# Patient Record
Sex: FEMALE | Race: WHITE | NOT HISPANIC OR LATINO | Employment: STUDENT | URBAN - METROPOLITAN AREA
[De-identification: names, ages, dates, MRNs, and addresses within clinical notes are randomized per-mention and may not be internally consistent; named-entity substitution may affect disease eponyms.]

---
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
## 2023-07-17 ENCOUNTER — TELEPHONE (OUTPATIENT)
Dept: INTERNAL MEDICINE CLINIC | Facility: OTHER | Age: 18
End: 2023-07-17

## 2023-07-17 ENCOUNTER — OFFICE VISIT (OUTPATIENT)
Dept: INTERNAL MEDICINE CLINIC | Age: 18
End: 2023-07-17
Payer: COMMERCIAL

## 2023-07-17 VITALS
BODY MASS INDEX: 28.95 KG/M2 | HEART RATE: 93 BPM | WEIGHT: 191 LBS | OXYGEN SATURATION: 98 % | HEIGHT: 68 IN | DIASTOLIC BLOOD PRESSURE: 72 MMHG | SYSTOLIC BLOOD PRESSURE: 102 MMHG | TEMPERATURE: 97.3 F

## 2023-07-17 DIAGNOSIS — Z11.59 NEED FOR HEPATITIS C SCREENING TEST: ICD-10-CM

## 2023-07-17 DIAGNOSIS — Z13.1 DIABETES MELLITUS SCREENING: ICD-10-CM

## 2023-07-17 DIAGNOSIS — L02.91 ABSCESS: Primary | ICD-10-CM

## 2023-07-17 DIAGNOSIS — Z13.220 LIPID SCREENING: ICD-10-CM

## 2023-07-17 DIAGNOSIS — Z11.4 ENCOUNTER FOR SCREENING FOR HIV: ICD-10-CM

## 2023-07-17 DIAGNOSIS — Z13.29 THYROID DISORDER SCREEN: ICD-10-CM

## 2023-07-17 PROCEDURE — 99202 OFFICE O/P NEW SF 15 MIN: CPT | Performed by: STUDENT IN AN ORGANIZED HEALTH CARE EDUCATION/TRAINING PROGRAM

## 2023-07-17 RX ORDER — AMOXICILLIN 500 MG/1
500 TABLET, FILM COATED ORAL 2 TIMES DAILY
COMMUNITY
Start: 2023-07-17 | End: 2023-07-21

## 2023-07-17 RX ORDER — DOXYCYCLINE HYCLATE 100 MG
100 TABLET ORAL 2 TIMES DAILY
Qty: 14 TABLET | Refills: 0 | Status: SHIPPED | OUTPATIENT
Start: 2023-07-17 | End: 2023-07-21

## 2023-07-17 NOTE — TELEPHONE ENCOUNTER
Hi, this is Milton Sessions I'm calling on my daughter Genet Stone. She was in earlier to see Doctor Heber Leiva and he wants her to get a CT scan and I can't get her in until next Thursday. So I just want to see if that's OK to wait till then or try to, you know get it done sooner. If you can give me a call back at [phone]. I did schedule it in [facility]. Thank you.

## 2023-07-17 NOTE — PROGRESS NOTES
[address]  INTERNAL MEDICINE OFFICE VISIT     PATIENT INFORMATION     Hillary Dubin   19 y.o. female   MRN: [id number]    ASSESSMENT/PLAN     Problem List Items Addressed This Visit    None  Visit Diagnoses     Abscess    -  Primary    Relevant Medications    doxycycline hyclate (VIBRA-TABS) 100 mg tablet    Other Relevant Orders    CT soft tissue neck w contrast    CBC and differential    Encounter for screening for HIV        Relevant Orders    : HIV 1/2 AB/AG w Reflex [facility] for 2 yr old and above    Need for hepatitis C screening test        Relevant Orders    Hepatitis C antibody    Lipid screening        Relevant Orders    Lipid Panel with Direct LDL reflex    Thyroid disorder screen        Relevant Orders    TSH, 3rd generation with Free T4 reflex    Diabetes mellitus screening        Relevant Orders    Hemoglobin A1C        Schedule a follow-up appointment in 6 months for annual.    1. Abscess, L supraclavicular neck region  Indurated raised walled off, tender, no drainage or pus  Afebrile but recurrence     -CBC to assess WBC  -Doxycycline for staph coverage given previous pus   -Please stop Amoxicillin at this time  -CT contrast soft tissue neck to evaluate for abscess and determine anatomic origin      2. Screening    -TSH, lipid, a1c, HIV, hepC  -Annual physical next visit    HEALTH MAINTENANCE     Immunization History   Administered Date(s) Administered   • COVID-19 PFIZER VACCINE 0.3 ML IM 12/27/2021, 01/17/2022     CHIEF COMPLAINT     Chief Complaint   Patient presents with   • New Patient Visit     np--acute patient has a lump on the left side of the neck  not sure if it is something else beside a pimple- patient has had this before and was told it was a pimple. Used otc retin cream and it went away but now is back- patient states area is painful and red.     • Headache     Patient has frequent headaches- patient takes Excedrin often       HISTORY OF PRESENT ILLNESS Gisselle Rivas is a 19 yr old F with PMH R/L breast reduction mammoplasty who presents with L neck lump/pimple. Patient reports in April, had L neck pimple that had white pus draining, was treated with tretinoin cream for suspected acne. Had recurrence over the weekend on Saturday, with also initial pus which drained. Does not rub against bra strap, no known bug bites, no contact exposures, not itchy. Denies fever, chills, chest pain or SOB, N/V, cough or congestion. Was taking 5 day course of amoxicillin per dentist.     REVIEW OF SYSTEMS     Review of Systems   All other systems reviewed and are negative. OBJECTIVE     Vitals:    07/17/23 1415   BP: 102/72   BP Location: Left arm   Patient Position: Sitting   Cuff Size: Large   Pulse: 93   Temp: (!) 97.3 °F (36.3 °C)   TempSrc: Temporal   SpO2: 98%   Weight: 86.6 kg (191 lb)   Height: 5' 7.5" (1.715 m)     Physical Exam  Vitals reviewed. Constitutional:       General: She is not in acute distress. HENT:      Head: Normocephalic and atraumatic. Nose: No congestion. Mouth/Throat:      Pharynx: Oropharynx is clear. Eyes:      Extraocular Movements: Extraocular movements intact. Conjunctiva/sclera: Conjunctivae normal.   Cardiovascular:      Rate and Rhythm: Normal rate and regular rhythm. Pulses: Normal pulses. Heart sounds: Normal heart sounds. Pulmonary:      Effort: Pulmonary effort is normal.      Breath sounds: Normal breath sounds. Abdominal:      Palpations: Abdomen is soft. Tenderness: There is no abdominal tenderness. Musculoskeletal:         General: Normal range of motion. Cervical back: Normal range of motion and neck supple. Tenderness (L supraclavicular neck, erythematous indurated walled off, without drainage or pus) present. Neurological:      Mental Status: She is alert and oriented to person, place, and time.        CURRENT MEDICATIONS     Current Outpatient Medications:   •  amoxicillin (AMOXIL) 500 MG tablet, Take 500 mg by mouth 2 (two) times a day, Disp: , Rfl:   •  Aspirin-Acetaminophen-Caffeine (EXCEDRIN MIGRAINE PO), Take 1 tablet by mouth as needed (headaches), Disp: , Rfl:   •  doxycycline hyclate (VIBRA-TABS) 100 mg tablet, Take 1 tablet (100 mg total) by mouth 2 (two) times a day for 7 days, Disp: 14 tablet, Rfl: 0  •  norethindrone-ethinyl estradiol (Junel FE 1/20) 1-20 MG-MCG per tablet, Take 1 tablet by mouth daily, Disp: 90 tablet, Rfl: 4  •  cholecalciferol (VITAMIN D3) 400 units tablet, Take 400 Units by mouth daily, Disp: , Rfl:   •  cyproheptadine (PERIACTIN) 4 mg tablet, Take 4 mg by mouth 3 (three) times a day as needed for allergies, Disp: , Rfl:   •  Lidocaine Viscous HCl (XYLOCAINE) 2 % mucosal solution, Swish and spit 10 mL 4 (four) times a day as needed for mouth pain or discomfort, Disp: 100 mL, Rfl: 1  •  norethindrone-ethinyl estradiol (JUNEL FE 1/20) 1-20 MG-MCG per tablet, Take 1 tablet by mouth daily, Disp: 30 tablet, Rfl: 5  •  norethindrone-ethinyl estradiol (JUNEL FE 1/20) 1-20 MG-MCG per tablet, Take 1 tablet by mouth daily, Disp: 90 tablet, Rfl: 4    PAST MEDICAL & SURGICAL HISTORY     Past Medical History:   Diagnosis Date   • Migraine      Past Surgical History:   Procedure Laterality Date   • HERNIA REPAIR  2006   • REDUCTION MAMMAPLASTY  07/07/2020     SOCIAL & FAMILY HISTORY     Social History     Socioeconomic History   • Marital status: Single     Spouse name: Not on file   • Number of children: Not on file   • Years of education: Not on file   • Highest education level: Not on file   Occupational History   • Not on file   Tobacco Use   • Smoking status: Never   • Smokeless tobacco: Never   Vaping Use   • Vaping Use: Never used   Substance and Sexual Activity   • Alcohol use: Never   • Drug use: Never   • Sexual activity: Never   Other Topics Concern   • Not on file   Social History Narrative   • Not on file     Social Determinants of Health     Financial Resource Strain: Not on file   Food Insecurity: Not on file   Transportation Needs: Not on file   Physical Activity: Not on file   Stress: Not on file   Social Connections: Not on file   Intimate Partner Violence: Not on file   Housing Stability: Not on file     Social History     Substance and Sexual Activity   Alcohol Use Never       Social History     Substance and Sexual Activity   Drug Use Never     Social History     Tobacco Use   Smoking Status Never   Smokeless Tobacco Never     Family History   Problem Relation Age of Onset   • Thyroid cancer Mother    • No Known Problems Sister    • No Known Problems Sister    • No Known Problems Sister    • Hypertension Maternal Grandfather                ==  Santos Dykes MD  PGY-2  [address], [address] [place] [address] Road  Office: [phone]  Fax: [phone]

## 2023-07-19 ENCOUNTER — HOSPITAL ENCOUNTER (INPATIENT)
Facility: HOSPITAL | Age: 18
LOS: 2 days | Discharge: HOME | DRG: 809 | End: 2023-07-21
Attending: EMERGENCY MEDICINE | Admitting: INTERNAL MEDICINE
Payer: COMMERCIAL

## 2023-07-19 ENCOUNTER — TELEPHONE (OUTPATIENT)
Dept: INTERNAL MEDICINE CLINIC | Age: 18
End: 2023-07-19

## 2023-07-19 ENCOUNTER — APPOINTMENT (OUTPATIENT)
Dept: LAB | Age: 18
End: 2023-07-19
Payer: COMMERCIAL

## 2023-07-19 ENCOUNTER — APPOINTMENT (INPATIENT)
Dept: RADIOLOGY | Facility: HOSPITAL | Age: 18
DRG: 809 | End: 2023-07-19
Payer: COMMERCIAL

## 2023-07-19 DIAGNOSIS — L03.319 CELLULITIS AND ABSCESS OF TRUNK: ICD-10-CM

## 2023-07-19 DIAGNOSIS — D50.9 IRON DEFICIENCY ANEMIA, UNSPECIFIED IRON DEFICIENCY ANEMIA TYPE: ICD-10-CM

## 2023-07-19 DIAGNOSIS — Z30.41 SURVEILLANCE FOR BIRTH CONTROL, ORAL CONTRACEPTIVES: ICD-10-CM

## 2023-07-19 DIAGNOSIS — Z11.4 ENCOUNTER FOR SCREENING FOR HIV: ICD-10-CM

## 2023-07-19 DIAGNOSIS — L02.91 ABSCESS: ICD-10-CM

## 2023-07-19 DIAGNOSIS — D70.9 NEUTROPENIA, UNSPECIFIED TYPE (HCC): ICD-10-CM

## 2023-07-19 DIAGNOSIS — E53.8 B12 DEFICIENCY: ICD-10-CM

## 2023-07-19 DIAGNOSIS — Z13.220 LIPID SCREENING: ICD-10-CM

## 2023-07-19 DIAGNOSIS — Z13.1 DIABETES MELLITUS SCREENING: ICD-10-CM

## 2023-07-19 DIAGNOSIS — L02.11 ABSCESS, NECK: Primary | ICD-10-CM

## 2023-07-19 DIAGNOSIS — Z11.59 NEED FOR HEPATITIS C SCREENING TEST: ICD-10-CM

## 2023-07-19 DIAGNOSIS — L02.219 CELLULITIS AND ABSCESS OF TRUNK: ICD-10-CM

## 2023-07-19 DIAGNOSIS — Z13.29 THYROID DISORDER SCREEN: ICD-10-CM

## 2023-07-19 PROBLEM — D64.9 ANEMIA: Status: ACTIVE | Noted: 2023-07-19

## 2023-07-19 PROBLEM — D56.9 THALASSEMIA: Status: ACTIVE | Noted: 2023-07-19

## 2023-07-19 LAB
ANION GAP SERPL CALCULATED.3IONS-SCNC: 4 MMOL/L
BASOPHILS # BLD AUTO: 0.01 THOUSANDS/ÂΜL (ref 0–0.1)
BASOPHILS # BLD AUTO: 0.01 THOUSANDS/ÂΜL (ref 0–0.1)
BASOPHILS NFR BLD AUTO: 0 % (ref 0–1)
BASOPHILS NFR BLD AUTO: 0 % (ref 0–1)
BUN SERPL-MCNC: 8 MG/DL (ref 5–25)
CALCIUM SERPL-MCNC: 9.5 MG/DL (ref 8.3–10.1)
CHLORIDE SERPL-SCNC: 109 MMOL/L (ref 96–108)
CHOLEST SERPL-MCNC: 217 MG/DL
CO2 SERPL-SCNC: 24 MMOL/L (ref 21–32)
CREAT SERPL-MCNC: 0.74 MG/DL (ref 0.6–1.3)
EOSINOPHIL # BLD AUTO: 0.08 THOUSAND/ÂΜL (ref 0–0.61)
EOSINOPHIL # BLD AUTO: 0.09 THOUSAND/ÂΜL (ref 0–0.61)
EOSINOPHIL NFR BLD AUTO: 3 % (ref 0–6)
EOSINOPHIL NFR BLD AUTO: 4 % (ref 0–6)
ERYTHROCYTE [DISTWIDTH] IN BLOOD BY AUTOMATED COUNT: 15.6 % (ref 11.6–15.1)
ERYTHROCYTE [DISTWIDTH] IN BLOOD BY AUTOMATED COUNT: 15.8 % (ref 11.6–15.1)
EST. AVERAGE GLUCOSE BLD GHB EST-MCNC: 100 MG/DL
GFR SERPL CREATININE-BSD FRML MDRD: 116 ML/MIN/1.73SQ M
GLUCOSE SERPL-MCNC: 93 MG/DL (ref 65–140)
HBA1C MFR BLD: 5.1 %
HCT VFR BLD AUTO: 35 % (ref 34.8–46.1)
HCT VFR BLD AUTO: 35.7 % (ref 34.8–46.1)
HDLC SERPL-MCNC: 55 MG/DL
HGB BLD-MCNC: 10.6 G/DL (ref 11.5–15.4)
HGB BLD-MCNC: 10.7 G/DL (ref 11.5–15.4)
IMM GRANULOCYTES # BLD AUTO: 0 THOUSAND/UL (ref 0–0.2)
IMM GRANULOCYTES # BLD AUTO: 0.01 THOUSAND/UL (ref 0–0.2)
IMM GRANULOCYTES NFR BLD AUTO: 0 % (ref 0–2)
IMM GRANULOCYTES NFR BLD AUTO: 0 % (ref 0–2)
LDLC SERPL CALC-MCNC: 135 MG/DL (ref 0–100)
LYMPHOCYTES # BLD AUTO: 1.67 THOUSANDS/ÂΜL (ref 0.6–4.47)
LYMPHOCYTES # BLD AUTO: 1.69 THOUSANDS/ÂΜL (ref 0.6–4.47)
LYMPHOCYTES NFR BLD AUTO: 55 % (ref 14–44)
LYMPHOCYTES NFR BLD AUTO: 64 % (ref 14–44)
MCH RBC QN AUTO: 19 PG (ref 26.8–34.3)
MCH RBC QN AUTO: 19.3 PG (ref 26.8–34.3)
MCHC RBC AUTO-ENTMCNC: 29.7 G/DL (ref 31.4–37.4)
MCHC RBC AUTO-ENTMCNC: 30.6 G/DL (ref 31.4–37.4)
MCV RBC AUTO: 63 FL (ref 82–98)
MCV RBC AUTO: 64 FL (ref 82–98)
MONOCYTES # BLD AUTO: 0.65 THOUSAND/ÂΜL (ref 0.17–1.22)
MONOCYTES # BLD AUTO: 0.69 THOUSAND/ÂΜL (ref 0.17–1.22)
MONOCYTES NFR BLD AUTO: 22 % (ref 4–12)
MONOCYTES NFR BLD AUTO: 25 % (ref 4–12)
NEUTROPHILS # BLD AUTO: 0.18 THOUSANDS/ÂΜL (ref 1.85–7.62)
NEUTROPHILS # BLD AUTO: 0.61 THOUSANDS/ÂΜL (ref 1.85–7.62)
NEUTS SEG NFR BLD AUTO: 20 % (ref 43–75)
NEUTS SEG NFR BLD AUTO: 7 % (ref 43–75)
NRBC BLD AUTO-RTO: 0 /100 WBCS
NRBC BLD AUTO-RTO: 0 /100 WBCS
PLATELET # BLD AUTO: 380 THOUSANDS/UL (ref 149–390)
PLATELET # BLD AUTO: 425 THOUSANDS/UL (ref 149–390)
PMV BLD AUTO: 10.7 FL (ref 8.9–12.7)
PMV BLD AUTO: 11.6 FL (ref 8.9–12.7)
POTASSIUM SERPL-SCNC: 4.3 MMOL/L (ref 3.5–5.3)
RBC # BLD AUTO: 5.54 MILLION/UL (ref 3.81–5.12)
RBC # BLD AUTO: 5.58 MILLION/UL (ref 3.81–5.12)
SODIUM SERPL-SCNC: 137 MMOL/L (ref 135–147)
TRIGL SERPL-MCNC: 135 MG/DL
TSH SERPL DL<=0.05 MIU/L-ACNC: 1.15 UIU/ML (ref 0.45–4.5)
WBC # BLD AUTO: 2.6 THOUSAND/UL (ref 4.31–10.16)
WBC # BLD AUTO: 3.09 THOUSAND/UL (ref 4.31–10.16)

## 2023-07-19 PROCEDURE — 80048 BASIC METABOLIC PNL TOTAL CA: CPT

## 2023-07-19 PROCEDURE — 84443 ASSAY THYROID STIM HORMONE: CPT

## 2023-07-19 PROCEDURE — 99285 EMERGENCY DEPT VISIT HI MDM: CPT | Performed by: EMERGENCY MEDICINE

## 2023-07-19 PROCEDURE — 85025 COMPLETE CBC W/AUTO DIFF WBC: CPT

## 2023-07-19 PROCEDURE — 82607 VITAMIN B-12: CPT

## 2023-07-19 PROCEDURE — NC001 PR NO CHARGE: Performed by: EMERGENCY MEDICINE

## 2023-07-19 PROCEDURE — 82728 ASSAY OF FERRITIN: CPT

## 2023-07-19 PROCEDURE — 96374 THER/PROPH/DIAG INJ IV PUSH: CPT

## 2023-07-19 PROCEDURE — 99284 EMERGENCY DEPT VISIT MOD MDM: CPT

## 2023-07-19 PROCEDURE — 70491 CT SOFT TISSUE NECK W/DYE: CPT

## 2023-07-19 PROCEDURE — 86803 HEPATITIS C AB TEST: CPT

## 2023-07-19 PROCEDURE — G1004 CDSM NDSC: HCPCS

## 2023-07-19 PROCEDURE — 83550 IRON BINDING TEST: CPT

## 2023-07-19 PROCEDURE — 80061 LIPID PANEL: CPT

## 2023-07-19 PROCEDURE — 83540 ASSAY OF IRON: CPT

## 2023-07-19 PROCEDURE — NC001 PR NO CHARGE: Performed by: INTERNAL MEDICINE

## 2023-07-19 PROCEDURE — 82746 ASSAY OF FOLIC ACID SERUM: CPT

## 2023-07-19 PROCEDURE — 83036 HEMOGLOBIN GLYCOSYLATED A1C: CPT

## 2023-07-19 PROCEDURE — 36415 COLL VENOUS BLD VENIPUNCTURE: CPT

## 2023-07-19 PROCEDURE — 87389 HIV-1 AG W/HIV-1&-2 AB AG IA: CPT

## 2023-07-19 RX ORDER — CEFAZOLIN SODIUM 2 G/50ML
2000 SOLUTION INTRAVENOUS ONCE
Status: COMPLETED | OUTPATIENT
Start: 2023-07-19 | End: 2023-07-19

## 2023-07-19 RX ADMIN — IOHEXOL 85 ML: 350 INJECTION, SOLUTION INTRAVENOUS at 22:17

## 2023-07-19 RX ADMIN — CEFAZOLIN SODIUM 2000 MG: 2 SOLUTION INTRAVENOUS at 21:43

## 2023-07-19 RX ADMIN — VANCOMYCIN HYDROCHLORIDE 1250 MG: 1 INJECTION, POWDER, LYOPHILIZED, FOR SOLUTION INTRAVENOUS at 22:41

## 2023-07-19 NOTE — TELEPHONE ENCOUNTER
Patient's mom called the office and asked if you would please call them in regards to patient's labs that she had drawn this morning.

## 2023-07-19 NOTE — ED PROVIDER NOTES
History  Chief Complaint   Patient presents with   • Abnormal Lab     Pt had blood work done this AM, WBC elevated. PCP sent patient to ED for eval. Pt had lump on left subclavian area noted on Saturday. Redness noted around area and painful to touch. Pt currently taking doxycycline that was started yesterday. 19year old female presents to ED for evaluation of left neck abscess. Pt reports she had an abscess on her left neck in April, expressive of white pus, which resolved after using tretinoin. She states that 4 days ago, the abscess reappeared and has been growing more painful and red. It has not expressed pus. Pt denies fever, chills, chest pain, SOB, n/v/d, or other complaints. She was evaluated by her PCP for the same complaint and was started on doxycycline 100 mg BID 2 days ago. She was sent to the ED by PCP because her labwork showed neutropenia. Pt has a hx of thalassemia. Prior to Admission Medications   Prescriptions Last Dose Informant Patient Reported? Taking?    Aspirin-Acetaminophen-Caffeine (EXCEDRIN MIGRAINE PO)  Self Yes No   Sig: Take 1 tablet by mouth as needed (headaches)   Lidocaine Viscous HCl (XYLOCAINE) 2 % mucosal solution  Self No No   Sig: Swish and spit 10 mL 4 (four) times a day as needed for mouth pain or discomfort   amoxicillin (AMOXIL) 500 MG tablet  Self Yes No   Sig: Take 500 mg by mouth 2 (two) times a day   cholecalciferol (VITAMIN D3) 400 units tablet  Self Yes No   Sig: Take 400 Units by mouth daily   cyproheptadine (PERIACTIN) 4 mg tablet  Self Yes No   Sig: Take 4 mg by mouth 3 (three) times a day as needed for allergies   doxycycline hyclate (VIBRA-TABS) 100 mg tablet   No No   Sig: Take 1 tablet (100 mg total) by mouth 2 (two) times a day for 7 days   norethindrone-ethinyl estradiol (JUNEL FE 1/20) 1-20 MG-MCG per tablet  Self No No   Sig: Take 1 tablet by mouth daily   norethindrone-ethinyl estradiol (JUNEL FE 1/20) 1-20 MG-MCG per tablet  Self No No Sig: Take 1 tablet by mouth daily   norethindrone-ethinyl estradiol (Junel FE 1/20) 1-20 MG-MCG per tablet  Self No No   Sig: Take 1 tablet by mouth daily      Facility-Administered Medications: None       Past Medical History:   Diagnosis Date   • Migraine        Past Surgical History:   Procedure Laterality Date   • HERNIA REPAIR  2006   • REDUCTION MAMMAPLASTY  07/07/2020       Family History   Problem Relation Age of Onset   • Thyroid cancer Mother    • No Known Problems Sister    • No Known Problems Sister    • No Known Problems Sister    • Hypertension Maternal Grandfather      I have reviewed and agree with the history as documented. E-Cigarette/Vaping   • E-Cigarette Use Never User      E-Cigarette/Vaping Substances   • Nicotine No    • THC No    • CBD No    • Flavoring No    • Other No    • Unknown No      Social History     Tobacco Use   • Smoking status: Never   • Smokeless tobacco: Never   Vaping Use   • Vaping Use: Never used   Substance Use Topics   • Alcohol use: Never   • Drug use: Never        Review of Systems   Constitutional: Negative for chills and fever. HENT: Negative for ear pain and sore throat. Eyes: Negative for visual disturbance. Respiratory: Negative for cough and shortness of breath. Cardiovascular: Negative for chest pain and palpitations. Gastrointestinal: Negative for abdominal pain, diarrhea, nausea and vomiting. Genitourinary: Negative for difficulty urinating. Musculoskeletal: Negative for arthralgias and back pain. Skin:        Left neck area of swelling with surrounding redness. Neurological: Negative for headaches. All other systems reviewed and are negative.       Physical Exam  ED Triage Vitals   Temperature Pulse Respirations Blood Pressure SpO2   07/19/23 1830 07/19/23 1830 07/19/23 1830 07/19/23 1830 07/19/23 1830   98 °F (36.7 °C) 101 16 131/83 100 %      Temp Source Heart Rate Source Patient Position - Orthostatic VS BP Location FiO2 (%) 07/19/23 1830 07/19/23 2204 07/19/23 1830 07/19/23 1830 --   Temporal Monitor Sitting Left arm       Pain Score       07/19/23 1830       5             Orthostatic Vital Signs  Vitals:    07/19/23 1830 07/19/23 2204   BP: 131/83    Pulse: 101 95   Patient Position - Orthostatic VS: Sitting        Physical Exam  Vitals and nursing note reviewed. Constitutional:       General: She is not in acute distress. Appearance: Normal appearance. She is normal weight. HENT:      Head: Normocephalic and atraumatic. Right Ear: External ear normal.      Left Ear: External ear normal.      Nose: No rhinorrhea. Mouth/Throat:      Mouth: Mucous membranes are moist.      Pharynx: Oropharynx is clear. Eyes:      Extraocular Movements: Extraocular movements intact. Conjunctiva/sclera: Conjunctivae normal.      Pupils: Pupils are equal, round, and reactive to light. Cardiovascular:      Rate and Rhythm: Normal rate and regular rhythm. Pulses: Normal pulses. Heart sounds: Normal heart sounds. Pulmonary:      Effort: Pulmonary effort is normal.      Breath sounds: Normal breath sounds. Abdominal:      General: Abdomen is flat. Palpations: Abdomen is soft. Tenderness: There is no abdominal tenderness. Musculoskeletal:      Cervical back: Neck supple. No tenderness. Skin:     General: Skin is warm and dry. Comments: Left supraclavicular neck with 3 cm induration with surrounding erythema. No drainage or pus expressed. Tender to palpation. Neurological:      Mental Status: She is alert and oriented to person, place, and time.          ED Medications  Medications   ceFAZolin (ANCEF) IVPB (premix in dextrose) 2,000 mg 50 mL (2,000 mg Intravenous New Bag 7/19/23 2143)   vancomycin (VANCOCIN) 1,250 mg in sodium chloride 0.9 % 250 mL IVPB (has no administration in time range)       Diagnostic Studies  Results Reviewed     Procedure Component Value Units Date/Time    Basic metabolic panel [[id number]]  (Abnormal) Collected: 07/19/23 2014    Lab Status: Final result Specimen: Blood from Arm, Left Updated: 07/19/23 2044     Sodium 137 mmol/L      Potassium 4.3 mmol/L      Chloride 109 mmol/L      CO2 24 mmol/L      ANION GAP 4 mmol/L      BUN 8 mg/dL      Creatinine 0.74 mg/dL      Glucose 93 mg/dL      Calcium 9.5 mg/dL      eGFR 116 ml/min/1.73sq m     Narrative:      [place] guidelines for Chronic Kidney Disease (CKD):   •  Stage 1 with normal or high GFR (GFR > 90 mL/min/1.73 square meters)  •  Stage 2 Mild CKD (GFR = 60-89 mL/min/1.73 square meters)  •  Stage 3A Moderate CKD (GFR = 45-59 mL/min/1.73 square meters)  •  Stage 3B Moderate CKD (GFR = 30-44 mL/min/1.73 square meters)  •  Stage 4 Severe CKD (GFR = 15-29 mL/min/1.73 square meters)  •  Stage 5 End Stage CKD (GFR <15 mL/min/1.73 square meters)  Note: GFR calculation is accurate only with a steady state creatinine    CBC and differential [[id number]]  (Abnormal) Collected: 07/19/23 2014    Lab Status: Final result Specimen: Blood from Arm, Left Updated: 07/19/23 2030     WBC 3.09 Thousand/uL      RBC 5.54 Million/uL      Hemoglobin 10.7 g/dL      Hematocrit 35.0 %      MCV 63 fL      MCH 19.3 pg      MCHC 30.6 g/dL      RDW 15.6 %      MPV 10.7 fL      Platelets 178 Thousands/uL      nRBC 0 /100 WBCs      Neutrophils Relative 20 %      Immat GRANS % 0 %      Lymphocytes Relative 55 %      Monocytes Relative 22 %      Eosinophils Relative 3 %      Basophils Relative 0 %      Neutrophils Absolute 0.61 Thousands/µL      Immature Grans Absolute 0.01 Thousand/uL      Lymphocytes Absolute 1.69 Thousands/µL      Monocytes Absolute 0.69 Thousand/µL      Eosinophils Absolute 0.08 Thousand/µL      Basophils Absolute 0.01 Thousands/µL                  CT soft tissue neck with contrast    (Results Pending)         Procedures  Procedures      ED Course  ED Course as of 07/19/23 2209 Wed Jul 19, 2023 2148 Dr. Enedina Hoffman wanted pt admitted for further workup of neutropenia and for IV abx. Neutropenia confirmed on repeat CBC in ED. IV ancef and vancomycin started in ED. Will admit. CT neck ordered and surgery can drain abscess in the morning   2156 Ultrasound in ED showed 2 x 0.5 cm left supraclavicular abscess. Medical Decision Making  Nitish Sylvester is a 19 y.o. female who presents to ED for evaluation of left neck abscess. Pt reports a history of similar that resolved in April. Current episode began 4 days ago. The area has progressively gotten more tender. Pt saw her PCP for this and was started on doxycycline 2 days ago. A CT neck was ordered for tomorrow. She was sent to ED for neutropenia as an outpatient    Physical exam: Vitals stable. Afebrile. 3 cm area of induration with surrounding erythema on the left supraclavicular neck. Area is tender to palpation. Heart RRR. Lungs CTA b/l    Differential diagnoses include: Abscess    Workup includes: CBC, CT neck    Will follow up and continue to monitor. Please see ED Course for additional information. Amount and/or Complexity of Data Reviewed  Labs: ordered. Radiology: ordered. Risk  Prescription drug management. Decision regarding hospitalization. Disposition  Final diagnoses:   Abscess, neck     Time reflects when diagnosis was documented in both MDM as applicable and the Disposition within this note     Time User Action Codes Description Comment    7/19/2023  9:51 PM Arlen Aragon [L02.11] Abscess, neck       ED Disposition     ED Disposition   Admit    Condition   Stable    Date/Time   Wed Jul 19, 2023  9:52 PM    Comment   Case was discussed with SOD and the patient's admission status was agreed to be Admission Status: inpatient status to the service of Dr. Dania Victoria .            Follow-up Information    None         Patient's Medications   Discharge Prescriptions    No medications on file     No discharge procedures on file. PDMP Review     None           ED Provider  Attending physically available and evaluated Cortney Mention. I managed the patient along with the ED Attending.     Electronically Signed by         Leticia Altman DO  07/19/23 5409

## 2023-07-19 NOTE — TELEPHONE ENCOUNTER
Discussed with the patient mom WBC count are significantly low she is neutropenic although she does not have any fever but in the left clavicular area there is a infection and will test much of her neutropenia she need to go to the hospital for IV antibiotics

## 2023-07-19 NOTE — ED ATTENDING ATTESTATION
7/19/2023  Regan Katz DO, saw and evaluated the patient. I have discussed the patient with the resident/non-physician practitioner and agree with the resident's/non-physician practitioner's findings, Plan of Care, and MDM as documented in the resident's/non-physician practitioner's note, except where noted. All available labs and Radiology studies were reviewed. I was present for key portions of any procedure(s) performed by the resident/non-physician practitioner and I was immediately available to provide assistance. At this point I agree with the current assessment done in the Emergency Department. I have conducted an independent evaluation of this patient a history and physical is as follows:      19 yof w abscess to left neck. Sent by PCP to ED for neutropenia on o/p labs. Past Medical History:   Diagnosis Date   • Migraine      /83 (BP Location: Left arm)   Pulse 101   Temp 98 °F (36.7 °C) (Temporal)   Resp 16   Ht 5' 7" (1.702 m)   Wt 86.2 kg (190 lb)   LMP 06/30/2023 (Approximate)   SpO2 100%   BMI 29.76 kg/m²   NAD, A&Ox4, CTA, RRR (tachy on arrival, improved), abscess on left neck. D/w PCP who requests CT scan, IV abx and admission in the setting of neutropenia. US showed abscess but due to location and proximity to vasculature, will admit for surgical consultation/drainage.          ED Course         Critical Care Time  Procedures

## 2023-07-20 LAB
ALBUMIN SERPL BCP-MCNC: 2.9 G/DL (ref 3.5–5)
ALP SERPL-CCNC: 59 U/L (ref 46–116)
ALT SERPL W P-5'-P-CCNC: 16 U/L (ref 12–78)
ANION GAP SERPL CALCULATED.3IONS-SCNC: 2 MMOL/L
AST SERPL W P-5'-P-CCNC: 10 U/L (ref 5–45)
BASOPHILS # BLD AUTO: 0.01 THOUSANDS/ÂΜL (ref 0–0.1)
BASOPHILS NFR BLD AUTO: 0 % (ref 0–1)
BILIRUB SERPL-MCNC: 0.24 MG/DL (ref 0.2–1)
BUN SERPL-MCNC: 7 MG/DL (ref 5–25)
CALCIUM ALBUM COR SERPL-MCNC: 9.4 MG/DL (ref 8.3–10.1)
CALCIUM SERPL-MCNC: 8.5 MG/DL (ref 8.3–10.1)
CHLORIDE SERPL-SCNC: 113 MMOL/L (ref 96–108)
CO2 SERPL-SCNC: 24 MMOL/L (ref 21–32)
CREAT SERPL-MCNC: 0.56 MG/DL (ref 0.6–1.3)
EOSINOPHIL # BLD AUTO: 0.09 THOUSAND/ÂΜL (ref 0–0.61)
EOSINOPHIL NFR BLD AUTO: 3 % (ref 0–6)
ERYTHROCYTE [DISTWIDTH] IN BLOOD BY AUTOMATED COUNT: 15.7 % (ref 11.6–15.1)
FERRITIN SERPL-MCNC: 24 NG/ML (ref 11–307)
FOLATE SERPL-MCNC: 21 NG/ML
GFR SERPL CREATININE-BSD FRML MDRD: 134 ML/MIN/1.73SQ M
GLUCOSE SERPL-MCNC: 89 MG/DL (ref 65–140)
HCT VFR BLD AUTO: 31.3 % (ref 34.8–46.1)
HCV AB SER QL: NORMAL
HGB BLD-MCNC: 9.5 G/DL (ref 11.5–15.4)
HIV 1+2 AB+HIV1 P24 AG SERPL QL IA: NORMAL
HIV 2 AB SERPL QL IA: NORMAL
HIV1 AB SERPL QL IA: NORMAL
HIV1 P24 AG SERPL QL IA: NORMAL
IMM GRANULOCYTES # BLD AUTO: 0 THOUSAND/UL (ref 0–0.2)
IMM GRANULOCYTES NFR BLD AUTO: 0 % (ref 0–2)
IRON SATN MFR SERPL: 4 % (ref 15–50)
IRON SERPL-MCNC: 14 UG/DL (ref 50–170)
LYMPHOCYTES # BLD AUTO: 1.78 THOUSANDS/ÂΜL (ref 0.6–4.47)
LYMPHOCYTES NFR BLD AUTO: 65 % (ref 14–44)
MAGNESIUM SERPL-MCNC: 1.8 MG/DL (ref 1.6–2.6)
MCH RBC QN AUTO: 19.1 PG (ref 26.8–34.3)
MCHC RBC AUTO-ENTMCNC: 30.4 G/DL (ref 31.4–37.4)
MCV RBC AUTO: 63 FL (ref 82–98)
MONOCYTES # BLD AUTO: 0.7 THOUSAND/ÂΜL (ref 0.17–1.22)
MONOCYTES NFR BLD AUTO: 26 % (ref 4–12)
NEUTROPHILS # BLD AUTO: 0.17 THOUSANDS/ÂΜL (ref 1.85–7.62)
NEUTS SEG NFR BLD AUTO: 6 % (ref 43–75)
NRBC BLD AUTO-RTO: 0 /100 WBCS
PHOSPHATE SERPL-MCNC: 4.1 MG/DL (ref 2.7–4.5)
PLATELET # BLD AUTO: 344 THOUSANDS/UL (ref 149–390)
PMV BLD AUTO: 10.9 FL (ref 8.9–12.7)
POTASSIUM SERPL-SCNC: 3.6 MMOL/L (ref 3.5–5.3)
PROT SERPL-MCNC: 7.1 G/DL (ref 6.4–8.4)
RBC # BLD AUTO: 4.98 MILLION/UL (ref 3.81–5.12)
SODIUM SERPL-SCNC: 139 MMOL/L (ref 135–147)
TIBC SERPL-MCNC: 378 UG/DL (ref 250–450)
TSH SERPL DL<=0.05 MIU/L-ACNC: 1.06 UIU/ML (ref 0.45–4.5)
VIT B12 SERPL-MCNC: 98 PG/ML (ref 180–914)
WBC # BLD AUTO: 2.75 THOUSAND/UL (ref 4.31–10.16)

## 2023-07-20 PROCEDURE — 87205 SMEAR GRAM STAIN: CPT

## 2023-07-20 PROCEDURE — 87186 SC STD MICRODIL/AGAR DIL: CPT

## 2023-07-20 PROCEDURE — 85025 COMPLETE CBC W/AUTO DIFF WBC: CPT

## 2023-07-20 PROCEDURE — NC001 PR NO CHARGE: Performed by: STUDENT IN AN ORGANIZED HEALTH CARE EDUCATION/TRAINING PROGRAM

## 2023-07-20 PROCEDURE — 83735 ASSAY OF MAGNESIUM: CPT

## 2023-07-20 PROCEDURE — 80053 COMPREHEN METABOLIC PANEL: CPT

## 2023-07-20 PROCEDURE — 0J953ZZ DRAINAGE OF LEFT NECK SUBCUTANEOUS TISSUE AND FASCIA, PERCUTANEOUS APPROACH: ICD-10-PCS | Performed by: STUDENT IN AN ORGANIZED HEALTH CARE EDUCATION/TRAINING PROGRAM

## 2023-07-20 PROCEDURE — 84100 ASSAY OF PHOSPHORUS: CPT

## 2023-07-20 PROCEDURE — NC001 PR NO CHARGE: Performed by: INTERNAL MEDICINE

## 2023-07-20 PROCEDURE — 87077 CULTURE AEROBIC IDENTIFY: CPT

## 2023-07-20 PROCEDURE — 99223 1ST HOSP IP/OBS HIGH 75: CPT | Performed by: INTERNAL MEDICINE

## 2023-07-20 PROCEDURE — 99254 IP/OBS CNSLTJ NEW/EST MOD 60: CPT | Performed by: STUDENT IN AN ORGANIZED HEALTH CARE EDUCATION/TRAINING PROGRAM

## 2023-07-20 PROCEDURE — 82525 ASSAY OF COPPER: CPT

## 2023-07-20 PROCEDURE — 87070 CULTURE OTHR SPECIMN AEROBIC: CPT

## 2023-07-20 PROCEDURE — 36415 COLL VENOUS BLD VENIPUNCTURE: CPT

## 2023-07-20 RX ORDER — LIDOCAINE HYDROCHLORIDE 10 MG/ML
20 INJECTION, SOLUTION EPIDURAL; INFILTRATION; INTRACAUDAL; PERINEURAL ONCE
Status: COMPLETED | OUTPATIENT
Start: 2023-07-20 | End: 2023-07-20

## 2023-07-20 RX ORDER — CEFAZOLIN SODIUM 1 G/50ML
1000 SOLUTION INTRAVENOUS EVERY 8 HOURS
Status: DISCONTINUED | OUTPATIENT
Start: 2023-07-20 | End: 2023-07-20

## 2023-07-20 RX ORDER — FERROUS SULFATE 325(65) MG
325 TABLET ORAL
Status: DISCONTINUED | OUTPATIENT
Start: 2023-07-21 | End: 2023-07-21 | Stop reason: HOSPADM

## 2023-07-20 RX ORDER — ACETAMINOPHEN 325 MG/1
650 TABLET ORAL ONCE
Status: DISCONTINUED | OUTPATIENT
Start: 2023-07-20 | End: 2023-07-21 | Stop reason: HOSPADM

## 2023-07-20 RX ADMIN — VANCOMYCIN HYDROCHLORIDE 1250 MG: 10 INJECTION, POWDER, LYOPHILIZED, FOR SOLUTION INTRAVENOUS at 11:55

## 2023-07-20 RX ADMIN — VANCOMYCIN HYDROCHLORIDE 1250 MG: 10 INJECTION, POWDER, LYOPHILIZED, FOR SOLUTION INTRAVENOUS at 23:03

## 2023-07-20 RX ADMIN — CYANOCOBALAMIN TAB 500 MCG 1000 MCG: 500 TAB at 09:00

## 2023-07-20 RX ADMIN — LIDOCAINE HYDROCHLORIDE 10 ML: 10 INJECTION, SOLUTION EPIDURAL; INFILTRATION; INTRACAUDAL; PERINEURAL at 17:41

## 2023-07-20 NOTE — PROGRESS NOTES
INTERNAL MEDICINE RESIDENCY PROGRESS NOTE     Name: Damaris Steen   Age & Sex: 19 y.o. female   MRN: [id number]  Unit/Bed#: -01   Encounter: [id number]  Team: SOD Team C     PATIENT INFORMATION     Name: Damaris Steen   Age & Sex: 19 y.o. female   MRN: [id number]  Hospital Stay Days: 1    ASSESSMENT/PLAN     Principal Problem:    Neutropenia ([address])  Active Problems:    Left supraclavicular abcess    Thalassemia    Anemia      * Neutropenia ([address])  Assessment & Plan  Patient presents with acute on chronic neutropenia, in the setting of left supraclavicular abscess, CBC demonstrating absolute neutrophil count of 0.17, physical exam notable for a 2 cm supraclavicular soft tissue abscess tender to palpation with erythema overlying, CT demonstrating the same with reactive cervical lymphadenopathy. Hematology oncology consulted, general surgery consulted. Etiology unclear at this time, likely due to nutritional/vitamin deficiencies. Plan:  - Peripheral smear follow up  - Copper level follow up  - Heme-onc following, appreciate assistance with care   - follow up vitamin levels   - start Iron supplements   - continue B12  - Neutropenic precautions in place    Left supraclavicular abcess  Assessment & Plan  Patient presents with a supraclavicular 2 cm tender neck mass overlying with erythema, present previously back in April which resolved on its own. Now worsened as compared to before. POA neck abscess, CT findings as above. Surgery team on board. Plan:  - Continue vancomycin for coverage of soft tissue infection  - s/p cefepime  - I&D plan per surgery at bedside  - Continue to monitor [place], WBCs and fever      Anemia  Assessment & Plan  Microcytic anemia, likely secondary to patient's beta thalassemia minor and/or nutritional deficiencies.     Plan:  - 7/19 Iron 14, B12 98, folate WNL  - [facility] labs in care everywhere indicate chronic anemia, leukopenia  - B12 deficiency newly diagnosed  - Oral iron supplementation started  - Start B12 1000 mcg daily      Thalassemia  Assessment & Plan  Was told that she had thalassemia and during a work-up for her breast reduction surgery in . Per care everywhere, smear at that time showed that his hemoglobin A 2 suggestive of beta thalassemia minor. Plan:  Outpatient follow-up      Disposition: remain inpatient for abscess I/D, antibiotics IV    SUBJECTIVE     Patient seen and examined. No acute events overnight. Patient has a history of a similar abscess in the same supraclavicular region in April, but it was less painful and resolved with use of topical tretinoin. This time around the abscess appeared overnight a few days ago and is much more erythematous painful. Patient has had dental pain since last week due to wisdom teeth impaction and a cavity, which she got filled this past Monday. She denies allergies, fever and childhood illnesses. She is up-to-date on her vaccinations. OBJECTIVE     Vitals:    23 2204 23 0115 23 0445 23 1517   BP:    104/65   BP Location:       Pulse: 95 94 72 83   Resp: 16 16 16    Temp:    98.6 °F (37 °C)   TempSrc:       SpO2: 99% 97% 98% 96%   Weight:       Height:          Temperature:   Temp (24hrs), Av.3 °F (36.8 °C), Min:98 °F (36.7 °C), Max:98.6 °F (37 °C)    Temperature: 98.6 °F (37 °C)  Intake & Output:  I/O        07 07 0701   07 0701   0700    IV Piggyback  300     Total Intake(mL/kg)  300 (3.5)     Net  +300                Weights:   IBW (Ideal Body Weight): 61.6 kg    Body mass index is 29.76 kg/m². Weight (last 2 days)     Date/Time Weight    23 1830 86.2 (190)        Physical Exam  Constitutional:       Appearance: Normal appearance. She is well-developed and well-groomed. HENT:      Head: Normocephalic. Jaw: No swelling. Mouth/Throat:      Mouth: Mucous membranes are moist. No oral lesions. Dentition: Normal dentition.  No dental abscesses. Pharynx: Oropharynx is clear. No pharyngeal swelling. Tonsils: No tonsillar abscesses. Eyes:      Extraocular Movements: Extraocular movements intact. Conjunctiva/sclera: Conjunctivae normal.   Neck:      Comments: Left-sided supraclavicular soft tissue mass, measuring approximately 2 cm, overlying with erythema, tender to palpation,, soft  Cardiovascular:      Rate and Rhythm: Normal rate and regular rhythm. Heart sounds: Normal heart sounds. Pulmonary:      Effort: Pulmonary effort is normal.   Abdominal:      General: Bowel sounds are normal.      Palpations: Abdomen is soft. Musculoskeletal:         General: Normal range of motion. Cervical back: Normal range of motion. No rigidity. Lymphadenopathy:      Head:      Right side of head: No submental, submandibular, tonsillar, preauricular or posterior auricular adenopathy. Left side of head: No submental, submandibular, tonsillar, preauricular or posterior auricular adenopathy. Cervical:      Right cervical: No superficial cervical adenopathy. Left cervical: No superficial cervical adenopathy. Upper Body:      Right upper body: No supraclavicular adenopathy. Left upper body: No supraclavicular adenopathy. Skin:     General: Skin is warm and dry. Capillary Refill: Capillary refill takes less than 2 seconds. Neurological:      General: No focal deficit present. Mental Status: She is alert and oriented to person, place, and time. Psychiatric:         Attention and Perception: Attention normal.         Mood and Affect: Mood normal.         Behavior: Behavior normal.       LABORATORY DATA     Labs: I have personally reviewed pertinent reports.   Results from last 7 days   Lab Units 07/20/23  0425 07/19/23 2014 07/19/23  0929   WBC Thousand/uL 2.75* 3.09* 2.60*   HEMOGLOBIN g/dL 9.5* 10.7* 10.6*   HEMATOCRIT % 31.3* 35.0 35.7   PLATELETS Thousands/uL 344 380 425*   NEUTROS PCT % 6* 20* 7*   MONOS PCT % 26* 22* 25*   EOS PCT % 3 3 4      Results from last 7 days   Lab Units 07/20/23  0425 07/19/23 2014   POTASSIUM mmol/L 3.6 4.3   CHLORIDE mmol/L 113* 109*   CO2 mmol/L 24 24   BUN mg/dL 7 8   CREATININE mg/dL 0.56* 0.74   CALCIUM mg/dL 8.5 9.5   ALK PHOS U/L 59  --    ALT U/L 16  --    AST U/L 10  --      Results from last 7 days   Lab Units 07/20/23  0425   MAGNESIUM mg/dL 1.8     Results from last 7 days   Lab Units 07/20/23  0425   PHOSPHORUS mg/dL 4.1                    IMAGING & DIAGNOSTIC TESTING     Radiology Results: I have personally reviewed pertinent reports. CT soft tissue neck with contrast    Result Date: 7/20/2023  Impression: Left-sided supraclavicular abscess, as described above. Workstation performed: [id number]     Other Diagnostic Testing: I have personally reviewed pertinent reports. ACTIVE MEDICATIONS     Current Facility-Administered Medications   Medication Dose Route Frequency   • cyanocobalamin (VITAMIN B-12) tablet 1,000 mcg  1,000 mcg Oral Daily   • [START ON 7/21/2023] ferrous sulfate tablet 325 mg  325 mg Oral Daily With Breakfast   • lidocaine (PF) (XYLOCAINE-MPF) 1 % injection 20 mL  20 mL Infiltration Once   • vancomycin (VANCOCIN) 1,250 mg in sodium chloride 0.9 % 250 mL IVPB  15 mg/kg Intravenous Q12H       VTE Pharmacologic Prophylaxis: low risk for VTE  VTE Mechanical Prophylaxis: Low risk for VTE    Portions of the record may have been created with voice recognition software. Occasional wrong word or "sound a like" substitutions may have occurred due to the inherent limitations of voice recognition software.   Read the chart carefully and recognize, using context, where substitutions have occurred.  ==    [id number]  Internal Medicine Residency PGY-1  Joshua Parker MD

## 2023-07-20 NOTE — ASSESSMENT & PLAN NOTE
Patient presented with a supraclavicular 2 cm tender neck mass overlying with erythema, present previously back in April which resolved on its own. Was recently started on Abx per her dental team, patient uncertain as to exact reason. She's s/p I/D with surgery team  CT demonstrated a 2cm supraclavicular neck mass with reactive cervical lymphadenopathy.   Wound cultures growing gram-negative rods    Plan:  -Continue levofloxacin for 7 days  -Follow-up with PCP for evaluation of incision and drainage site

## 2023-07-20 NOTE — PROGRESS NOTES
INTERNAL MEDICINE RESIDENCY SENIOR ADMISSION NOTE     Name: Damaris Seten   Age & Sex: 19 y.o. female   MRN: [id number]  Unit/Bed#: ED 27   Encounter: [id number]  Primary Care Provider: Wade Rodgers MD    Admit to team: SOD Team C     Patient seen and examined. Reviewed H&P per Dr. Mattie Andrade . Agree with the assessment and plan with any exception/addition as noted below: Active Problems:    Neutropenia (HCC)    Left supraclavicular abcess    Thalassemia    Anemia     53-year-old female with medical history of macromastia status post bilateral breast reduction in 7/7/2020, presenting the ED at the request of outpatient PCP due to presence of left neck abscess and neutropenia on outpatient with absolute neutrophil count of 610. Vitals and physical exam WN, aside from abscess noted on left neck area. No medications at home aside from birth control. Family is bedside and does report when the patient was undergoing work-up for her breast reduction, she was told at that time that she had thalassemia. Mother and father who are at bedside denies any respective history of thalassemia themselves  No documentation of thalassemia in the patient's chart upon chart review.     Plan:  · Continue on IV Vanc  · Surgery consult in a.m. for potential abscess drainage  · Will order peripheral smear iron panel, B12, folate, copper level for work-up of neutropenia  · Patient denying any rheum complaints  · Labs from care everywhere suggestive for beta thalassemia minor, will defer further work-up  · Rest of care per Dr. Haily Watkins note    Code Status: Level 1 - Full Code  Admission Status: INPATIENT   Disposition: Patient requires Med/Surg  Expected Length of Stay: >2 midnight

## 2023-07-20 NOTE — UTILIZATION REVIEW
Initial Clinical Review    Admission: Date/Time/Statement:   Admission Orders (From admission, onward)     Ordered        07/19/23 2155  INPATIENT ADMISSION  Once                      Orders Placed This Encounter   Procedures   • INPATIENT ADMISSION     Standing Status:   Standing     Number of Occurrences:   1     Order Specific Question:   Level of Care     Answer:   Med Surg [16]     Order Specific Question:   Estimated length of stay     Answer:   More than 2 Midnights     Order Specific Question:   Certification     Answer:   I certify that inpatient services are medically necessary for this patient for a duration of greater than two midnights. See H&P and MD Progress Notes for additional information about the patient's course of treatment. ED Arrival Information     Expected   -    Arrival   7/19/2023 18:26    Acuity   Urgent            Means of arrival   Walk-In    Escorted by   Self    Service   SOD-C Medicine    Admission type   Emergency            Arrival complaint   abnormal lab result           Chief Complaint   Patient presents with   • Abnormal Lab     Pt had blood work done this AM, WBC elevated. PCP sent patient to ED for eval. Pt had lump on left subclavian area noted on Saturday. Redness noted around area and painful to touch. Pt currently taking doxycycline that was started yesterday. Initial Presentation:   19 yof to ER from home, sent by PCP 2nd neutropenia; c/o L neck abscess. Pt reports she had an abscess on her left neck in April, expressive of white pus, which resolved after using tretinoin. She states that 4 days ago, the abscess reappeared and has been growing more painful and red. It has not expressed pus. Evaluated by her PCP for the same complaint and was started on doxycycline 100 mg BID 2 days ago. Hx thalassemia. Presents tachycardic, L supraclavicular neck with 3 cm induration with surrounding erythema, no drainage or pus expressed, tender to palpation.  Admission work-up showing L supraclavicular abscess. Admitted to inpatient status for abscess & neutropenia. Started on IVABT    Date: 7/20/23   Day 2:   IVABT in progress for supraclavicular abscess. Placed on neutropenic precautions. Per surg: L supraclavicular abscess, neutropenia. Continue IVABT, monitor fevers, labs. Plan I&D at bedside later today. ED Triage Vitals   Temperature Pulse Respirations Blood Pressure SpO2   07/19/23 1830 07/19/23 1830 07/19/23 1830 07/19/23 1830 07/19/23 1830   98 °F (36.7 °C) 101 16 131/83 100 %      Temp Source Heart Rate Source Patient Position - Orthostatic VS BP Location FiO2 (%)   07/19/23 1830 07/19/23 2204 07/19/23 1830 07/19/23 1830 --   Temporal Monitor Sitting Left arm       Pain Score       07/19/23 1830       5          Wt Readings from Last 1 Encounters:   07/19/23 86.2 kg (190 lb)     Additional Vital Signs:   Date/Time Temp Pulse Resp BP SpO2 O2 Device Patient Position - Orthostatic VS   07/20/23 0445 -- 72 16 -- 98 % None (Room air) --   07/20/23 0115 -- 94 16 -- 97 % None (Room air) --   07/19/23 2204 -- 95 16 -- 99 % None (Room air) --   07/19/23 1830 98 °F (36.7 °C) 101 16 131/83 100 % None (Room air) Sitting     Pertinent Labs/Diagnostic Test Results:   CT soft tissue neck with contrast   Final Result by Dano Tran MD (07/20 0845)      Left-sided supraclavicular abscess, as described above.         Results from last 7 days   Lab Units 07/20/23 0425 07/19/23 2014 07/19/23  0929   WBC Thousand/uL 2.75* 3.09* 2.60*   HEMOGLOBIN g/dL 9.5* 10.7* 10.6*   HEMATOCRIT % 31.3* 35.0 35.7   PLATELETS Thousands/uL 344 380 425*   NEUTROS ABS Thousands/µL 0.17* 0.61* 0.18*     Results from last 7 days   Lab Units 07/20/23 0425 07/19/23 2014   SODIUM mmol/L 139 137   POTASSIUM mmol/L 3.6 4.3   CHLORIDE mmol/L 113* 109*   CO2 mmol/L 24 24   ANION GAP mmol/L 2 4   BUN mg/dL 7 8   CREATININE mg/dL 0.56* 0.74   EGFR ml/min/1.73sq m 134 116   CALCIUM mg/dL 8.5 9.5   MAGNESIUM mg/dL 1.8 --    PHOSPHORUS mg/dL 4.1  --      Results from last 7 days   Lab Units 07/20/23 0425   AST U/L 10   ALT U/L 16   ALK PHOS U/L 59   TOTAL PROTEIN g/dL 7.1   ALBUMIN g/dL 2.9*   TOTAL BILIRUBIN mg/dL 0.24     Results from last 7 days   Lab Units 07/20/23  0425 07/19/23 2014   GLUCOSE RANDOM mg/dL 89 93     Results from last 7 days   Lab Units 07/19/23  0929   HEMOGLOBIN A1C % 5.1   EAG mg/dl 100     Results from last 7 days   Lab Units 07/19/23 2014 07/19/23  0929   TSH 3RD GENERATON uIU/mL 1.063 1.145     Results from last 7 days   Lab Units 07/19/23 2014   FERRITIN ng/mL 24   IRON SATURATION % 4*   IRON ug/dL 14*   TIBC ug/dL 378     ED Treatment:   Medication Administration from 07/19/2023 1826 to 07/20/2023 0820       Date/Time Order Dose Route Action     07/19/2023 2143 EDT ceFAZolin (ANCEF) IVPB (premix in dextrose) 2,000 mg 50 mL 2,000 mg Intravenous New Bag     07/19/2023 2241 EDT vancomycin (VANCOCIN) 1,250 mg in sodium chloride 0.9 % 250 mL IVPB 1,250 mg Intravenous New Bag     07/19/2023 2217 EDT iohexol (OMNIPAQUE) 350 MG/ML injection (SINGLE-DOSE) 85 mL 85 mL Intravenous Given        Past Medical History:   Diagnosis Date   • Migraine      Present on Admission:  • Neutropenia (HCC)  • Left supraclavicular abcess      Admitting Diagnosis: Abscess, neck [L02.11]  Neutropenia, unspecified type ([address]) [D70.9]  Age/Sex: 19 y.o. female  Admission Orders:  Scd/foot pumps  Consult oncology  Consult surgery  Neutropenic precautions    Scheduled Medications:  vitamin B-12, 1,000 mcg, Oral, Daily  vancomycin, 15 mg/kg, Intravenous, Q12H    Network Utilization Review Department  ATTENTION: Please call with any questions or concerns to [phone] and carefully listen to the prompts so that you are directed to the right person.  All voicemails are confidential.  Florencio [place] all requests for admission clinical reviews, approved or denied determinations and any other requests to dedicated fax number below belonging to the [facility] where the patient is receiving treatment.  List of dedicated fax numbers for the Facilities:  [place] DENIALS (Administrative/Medical Necessity) [phone] [address] (Maternity/NICU/Pediatrics) [phone]   [address] [place] [address] [phone]   [facility] [address] [facility] [phone]   [address] [phone]   [address] [address] [phone]

## 2023-07-20 NOTE — ASSESSMENT & PLAN NOTE
Microcytic anemia, likely secondary to patient's beta thalassemia minor and/or nutritional deficiencies. B12 deficiency, Iron deficiency. Reviewed [facility] labs in care everywhere which indicate chronic anemia and leukopenia.   Discussed patient's care with hematology team    Plan:   -Continue oral iron, continue oral B12

## 2023-07-20 NOTE — PROGRESS NOTES
An Johnson is a 19 y.o. female who is currently ordered Vancomycin IV with management by the Pharmacy Consult service. Relevant clinical data and objective / subjective history reviewed. Vancomycin Assessment:  Indication and Goal AUC/Trough: Soft tissue (goal -600, trough >10)  Clinical Status: stable  Micro:   None  Renal Function:  SCr: 0.74 mg/dL  CrCl: >100 mL/min  Renal replacement: Not on dialysis  Days of Therapy: 2  Current Dose: 1250 mg IV x1  Vancomycin Plan:  New Dosin mg IV Q12H              Next dose due on  @ 0400  Estimated AUC: 461 mcg*hr/mL  Estimated Trough: 13.5 mcg/mL  Next Level: 23 @ 0600  Renal Function Monitoring: Daily BMP and [place] will continue to follow closely for s/sx of nephrotoxicity, infusion reactions and appropriateness of therapy. BMP and CBC will be ordered per protocol. We will continue to follow the patient’s culture results and clinical progress daily.     Alvaro Mathur, Pharmacist

## 2023-07-20 NOTE — H&P
INTERNAL MEDICINE RESIDENCY ADMISSION H&P     Name: Gladys Fortune   Age & Sex: 19 y.o. female   MRN: [id number]  Unit/Bed#: ED 27   Encounter: [id number]  Primary Care Provider: Ángela Elizabeth MD    Code Status: Level 1 - Full Code  Admission Status: INPATIENT   Disposition: Patient requires Med/Surg    Admit to team: SOD Team C     ASSESSMENT/PLAN     Active Problems:    Neutropenia ([address])    Left supraclavicular abcess    Thalassemia    Anemia      Neutropenia (HCC)  Assessment & Plan  Absolute neutrophil count of 610 this admission. Last absolute neutrophil count appears to be 900 on lab work done 6/10/2022 via care everywhere. Unlcear etiology. Labs also notable for anemia, microcytic. Plan:  peripheral smear  iron panel  B12  Folate  copper level  Heme-onc consult    Left supraclavicular abcess  Assessment & Plan  POA neck abscess    Plan:  vanc  Reconsult in the a.m. for potential drainage    Thalassemia  Assessment & Plan  Was told that she had thalassemia and during a work-up for her breast reduction surgery in 2020. Per care everywhere, smear at that time showed that his hemoglobin A 2 suggestive of beta thalassemia minor. Plan:  Outpatient follow-up    Anemia  Assessment & Plan  Microcytic anemia, likely secondary to patient's beta thalassemia minor. Plan:  CBC and iron studies      VTE Pharmacologic Prophylaxis: Reason for no pharmacologic prophylaxis patient can ambulate  VTE Mechanical Prophylaxis: sequential compression device    CHIEF COMPLAINT     Chief Complaint   Patient presents with   • Abnormal Lab     Pt had blood work done this AM, WBC elevated. PCP sent patient to ED for eval. Pt had lump on left subclavian area noted on Saturday. Redness noted around area and painful to touch. Pt currently taking doxycycline that was started yesterday.        HISTORY OF PRESENT ILLNESS     Pt is a 18 YO F with PMHx of beta- thalassemia minor dx'ed 2020 and recurrent sinus infection sent to the ED by PCP Dr. Sandoval Case for recurrent L supraclavicular abscess and neutropenia found incidentally. Pt states that she noticed an abscess forming near the L shoulder on Saturday and has since been growing and becoming more painful. She went to her PCP and started on doxycycline on Monday and underwent routine lab work which revealed anemia and neutropenia. Pt sent to ED by PCP for neutropenia work up. Pt denies any past history of neutropenia. Of note, she had a self-resolved draining abscess once in April at the same location. She did not require any antibiotics and she was asymptomatic. Denies any trauma to the site. Pt states she gets recurrent sinus infections, last time in April, which was treated with amoxicillin. No other severe illness in the past. No sick contacts at home. No other family members that are diagnosed with thalassemia and/or have chronic illness. No recent travel. For this admission, pt denies any abdominal pain. Denies f/n/v/chills/CP/SOB. Pt only endorses mild headache which is controlled with NSAIDs. ROS otherwise all negative. Patient was diagnosed with thalassemia in 2020, incidentally, after undergoing breast reduction surgery. She has not needed any transfusions in the past. Denies any issues with lightheadedness, LOC, or any bleeding, including the stool and urine. No other past medical history of note except for the breast reduction surgery and hernia repair. Takes Oral contraceptives  Family history: mother with history of thyroid cancer; paternal grandmother with history of MI    Pertinent findings:  L supraclavicular abscess and surrounding erythema  Labs: Leukopenia and neutrophils absolute count 610, microcytic anemia  CT imaging pending    REVIEW OF SYSTEMS     Review of Systems   Constitutional: Negative for chills and fever. HENT: Negative for ear pain and sinus pain. Respiratory: Negative for cough, shortness of breath and wheezing.     Cardiovascular: Negative for chest pain, palpitations and leg swelling. Gastrointestinal: Negative for abdominal pain, blood in stool, diarrhea, nausea and vomiting. Musculoskeletal: Positive for neck pain (from abscess). Neurological: Positive for headaches. Negative for syncope, weakness and light-headedness. OBJECTIVE     Vitals:    23 1830 23 2204 23 0115   BP: 131/83     BP Location: Left arm     Pulse: 101 95 94   Resp: 16 16 16   Temp: 98 °F (36.7 °C)     TempSrc: Temporal     SpO2: 100% 99% 97%   Weight: 86.2 kg (190 lb)     Height: 5' 7" (1.702 m)        Temperature:   Temp (24hrs), Av °F (36.7 °C), Min:98 °F (36.7 °C), Max:98 °F (36.7 °C)    Temperature: 98 °F (36.7 °C)  Intake & Output:  I/O     None        Weights:   IBW (Ideal Body Weight): 61.6 kg    Body mass index is 29.76 kg/m². Weight (last 2 days)     Date/Time Weight    23 1830 86.2 (190)        Physical Exam  Constitutional:       General: She is not in acute distress. Appearance: Normal appearance. She is not ill-appearing. HENT:      Head: Normocephalic and atraumatic. Mouth/Throat:      Mouth: Mucous membranes are moist.      Pharynx: Oropharynx is clear. Cardiovascular:      Rate and Rhythm: Normal rate and regular rhythm. Pulses: Normal pulses. Heart sounds: Normal heart sounds. No murmur heard. Pulmonary:      Effort: Pulmonary effort is normal. No respiratory distress. Breath sounds: Normal breath sounds. No wheezing. Abdominal:      General: Abdomen is flat. Palpations: Abdomen is soft. There is no mass. Tenderness: There is no abdominal tenderness. Musculoskeletal:      Cervical back: Normal range of motion. Skin:     General: Skin is warm. Capillary Refill: Capillary refill takes less than 2 seconds. Findings: Erythema (Hard abscess located in L supraclavicular region; non-purulent; not draining; extremely tender to touch) present.    Neurological:      Mental Status: She is alert and oriented to person, place, and time. PAST MEDICAL HISTORY     Past Medical History:   Diagnosis Date   • Migraine      PAST SURGICAL HISTORY     Past Surgical History:   Procedure Laterality Date   • HERNIA REPAIR  2006   • REDUCTION MAMMAPLASTY  07/07/2020     SOCIAL & FAMILY HISTORY     Social History     Substance and Sexual Activity   Alcohol Use Never     Substance and Sexual Activity   Alcohol Use Never        Substance and Sexual Activity   Drug Use Never     Social History     Tobacco Use   Smoking Status Never   Smokeless Tobacco Never     Family History   Problem Relation Age of Onset   • Thyroid cancer Mother    • No Known Problems Sister    • No Known Problems Sister    • No Known Problems Sister    • Hypertension Maternal Grandfather      LABORATORY DATA     Labs: I have personally reviewed pertinent reports. Results from last 7 days   Lab Units 07/19/23 2014 07/19/23  0929   WBC Thousand/uL 3.09* 2.60*   HEMOGLOBIN g/dL 10.7* 10.6*   HEMATOCRIT % 35.0 35.7   PLATELETS Thousands/uL 380 425*   NEUTROS PCT % 20* 7*   MONOS PCT % 22* 25*   EOS PCT % 3 4      Results from last 7 days   Lab Units 07/19/23 2014   POTASSIUM mmol/L 4.3   CHLORIDE mmol/L 109*   CO2 mmol/L 24   BUN mg/dL 8   CREATININE mg/dL 0.74   CALCIUM mg/dL 9.5                          Micro:  No results found for: "BLOODCX", "Patt Fausto", "WOUNDCULT", "SPUTUMCULTUR"  IMAGING & DIAGNOSTIC TESTS     Imaging: I have personally reviewed pertinent reports. No results found. EKG, Pathology, and Other Studies: I have personally reviewed pertinent reports. ALLERGIES   No Known Allergies  MEDICATIONS PRIOR TO ARRIVAL     Prior to Admission medications    Medication Sig Start Date End Date Taking?  Authorizing Provider   amoxicillin (AMOXIL) 500 MG tablet Take 500 mg by mouth 2 (two) times a day 7/17/23 7/31/23  Historical Provider, MD   Aspirin-Acetaminophen-Caffeine (EXCEDRIN MIGRAINE PO) Take 1 tablet by mouth as needed (headaches)    Historical Provider, MD   cholecalciferol (VITAMIN D3) 400 units tablet Take 400 Units by mouth daily    Historical Provider, MD   cyproheptadine (PERIACTIN) 4 mg tablet Take 4 mg by mouth 3 (three) times a day as needed for allergies    Historical Provider, MD   doxycycline hyclate (VIBRA-TABS) 100 mg tablet Take 1 tablet (100 mg total) by mouth 2 (two) times a day for 7 days 7/17/23 7/24/23  Willard Oleary MD   Lidocaine Viscous HCl (XYLOCAINE) 2 % mucosal solution Swish and spit 10 mL 4 (four) times a day as needed for mouth pain or discomfort 10/17/21   Xuan Brown MD   norethindrone-ethinyl estradiol (JUNEL FE 1/20) 1-20 MG-MCG per tablet Take 1 tablet by mouth daily 1/20/20   Vince Stinson PA-C   norethindrone-ethinyl estradiol (JUNEL FE 1/20) 1-20 MG-MCG per tablet Take 1 tablet by mouth daily 7/20/20   Vince Stinson PA-C   norethindrone-ethinyl estradiol (Junel FE 1/20) 1-20 MG-MCG per tablet Take 1 tablet by mouth daily 8/3/22   Stevo Loar MD     MEDICATIONS ADMINISTERED IN LAST 24 HOURS     Medication Administration - last 24 hours from 07/19/2023 0234 to 07/20/2023 0234       Date/Time Order Dose Route Action Action by     07/19/2023 2241 EDT ceFAZolin (ANCEF) IVPB (premix in dextrose) 2,000 mg 50 mL 0 mg Intravenous Stopped Maryann Baker RN     07/19/2023 2143 EDT ceFAZolin (ANCEF) IVPB (premix in dextrose) 2,000 mg 50 mL 2,000 mg Intravenous [address] [facility] Maryann Baker RN     07/20/2023 0100 EDT vancomycin (VANCOCIN) 1,250 mg in sodium chloride 0.9 % 250 mL IVPB 0 mg Intravenous Stopped Maryann Baker RN     07/19/2023 2241 EDT vancomycin (VANCOCIN) 1,250 mg in sodium chloride 0.9 % 250 mL IVPB 1,250 mg Intravenous [address]  Maryann Baker RN     07/19/2023 1278 EDT iohexol (OMNIPAQUE) 350 MG/ML injection (SINGLE-DOSE) 85 mL 85 mL Intravenous Given Kathee Castleman        CURRENT MEDICATIONS     Current Facility-Administered Medications Medication Dose Route Frequency Provider Last Rate   • vancomycin  15 mg/kg Intravenous Q12H Wiliam Gomez MD               Admission Time  I spent 30 minutes admitting the patient. This involved direct patient contact where I performed a full history and physical, reviewing previous records, and reviewing laboratory and other diagnostic studies. Portions of the record may have been created with voice recognition software. Occasional wrong word or "sound a like" substitutions may have occurred due to the inherent limitations of voice recognition software.   Read the chart carefully and recognize, using context, where substitutions have occurred.    ==  Pedro Palumbo MD  [facility]  Internal Medicine Residency PGY-1

## 2023-07-20 NOTE — UTILIZATION REVIEW
NOTIFICATION OF INPATIENT ADMISSION   AUTHORIZATION REQUEST   SERVICING FACILITY:   [address] [place] [address]  Address: [address] [place] [address], [facility] [address] [address]  Tax ID: [id number]  NPI: [id number] ATTENDING PROVIDER:  Attending Name and NPI#: Verna Mcarthur Md [[id number]]  Address: [address] [place] [address] [address] [address]  Phone: [phone]   ADMISSION INFORMATION:  Place of Service: Inpatient [address]  Place of Service Code: 21  Inpatient Admission Date/Time: 7/19/23  9:55 PM  Discharge Date/Time: No discharge date for patient encounter. Admitting Diagnosis Code/Description:  Abscess, neck [L02.11]  Neutropenia, unspecified type ([address]) [D70.9]     UTILIZATION REVIEW CONTACT:  Gail Lema, Utilization   Network Utilization Review Department  Phone: [phone]  Fax: [phone]  Email: [email or web address]  Contact for approvals/pending authorizations, clinical reviews, and discharge. PHYSICIAN ADVISORY SERVICES:  Medical Necessity Denial & Pksl-xv-Goio Review  Phone: [phone]  Fax: [phone]  Email: [email or web address]

## 2023-07-20 NOTE — CASE MANAGEMENT
Case Management Assessment & Discharge Planning Note    Patient name Aida Ryder  [facility] [address] [facility] 768/-82 MRN [id number]  : 2005 Date 2023       Current Admission Date: 2023  Current Admission Diagnosis:Neutropenia [facility])   Patient Active Problem List    Diagnosis Date Noted   • Neutropenia ([address]) 2023   • Left supraclavicular abcess 2023   • Thalassemia 2023   • Anemia 2023      LOS (days): 1  Geometric Mean LOS (GMLOS) (days):   Days to GMLOS:     OBJECTIVE:    Risk of Unplanned Readmission Score: 6.98         Current admission status: Inpatient       Preferred Pharmacy:   [place], [place] [facility] [place] [address]  Phone: [phone] Fax: [phone]    Primary Care Provider: Shelbi Gonzalez MD    Primary Insurance: BLUE CROSS  Secondary Insurance:     ASSESSMENT:  [place] Proxies    There are no active Health Care Proxies on file.                  Readmission Root Cause  30 Day Readmission: No    Patient Information  Admitted from[de-identified] Home  Mental Status: Alert  During Assessment patient was accompanied by: Parent  Assessment information provided by[de-identified] Parent, Patient  Primary Caregiver: Self  Support Systems: Family members, Parent  [place] of Residence: [address] do you live in?: [place]  Type of Current Residence: [address] [place] home  In the last 12 months, how many places have you lived?: 1  In the last 12 months, was there a time when you did not have a steady place to sleep or slept in a shelter (including now)?: No  Homeless/housing insecurity resource given?: N/A  Living Arrangements: Lives w/ Parent(s)  Is patient a ?: No    Activities of Daily Living Prior to Admission  Functional Status: Independent  Completes ADLs independently?: Yes  Ambulates independently?: Yes  Does patient use assisted devices?: No  Does patient currently own DME?: No  Does patient have a history of Outpatient Therapy (PT/OT)?: No  Does the patient have a history of Short-Term Rehab?: No  Does patient have a history of HHC?: No  Does patient currently have [facility]?: No         Patient Information Continued  Income Source: Other (Comment) (Student)  Does patient have prescription coverage?: Yes  Within the past 12 months, you worried that your food would run out before you got the money to buy more.: Never true  Within the past 12 months, the food you bought just didn't last and you didn't have money to get more.: Never true  Food insecurity resource given?: N/A  Does patient receive dialysis treatments?: No  Does patient have a history of substance abuse?: No  Does patient have a history of Mental Health Diagnosis?: No         Means of Transportation  Means of Transport to Appts[de-identified] Drives Self  In the past 12 months, has lack of transportation kept you from medical appointments or from getting medications?: No  In the past 12 months, has lack of transportation kept you from meetings, work, or from getting things needed for daily living?: No  Was application for public transport provided?: N/A        DISCHARGE DETAILS:    Discharge planning discussed with[de-identified] bedside wit pt and mother  Freedom of Choice: No  Comments - Freedom of Choice: No aftercare reccs at this time  CM contacted family/caregiver?: No- see comments (parent was bedside)             Contacts  Patient Contacts: Stephenie Rizo  Relationship to Patient[de-identified] Family (parent)  Contact Method: Phone  Phone Number: [phone]  Reason/Outcome: Continuity of Care, Emergency Contact, Discharge Planning    Requested 1334  Nasreen Malloy         Is the patient interested in [facility] at discharge?: No    DME Referral Provided  Referral made for DME?: No         Would you like to participate in our [address] service program?  : No - Declined       Additional Comments: Pt is a f/t college student at [employer] and is residing at home during break.  Pt was IPTA

## 2023-07-20 NOTE — ASSESSMENT & PLAN NOTE
Was told that she had thalassemia and during a work-up for her breast reduction surgery in 2020. Per care everywhere, smear at that time showed that his hemoglobin A 2 suggestive of beta thalassemia minor. Plan:  Follow-up with the hematology team in 2 to 4 weeks.

## 2023-07-20 NOTE — CONSULTS
Consultation - Acute Care Surgery  Cat Solis 19 y.o. female MRN: [id number]  Unit/Bed#: -01 Encounter: [id number]      Assessment/Plan     Cat Solis is a 19 y.o. female who presents with recurrent L supraclavicular abscess and neutropenia. Hx of beta thalassemia minor and recurrent sinus infections. CT neck shows Left-sided supraclavicular abscess. Afebrile, vital signs stable. Plan:  -Continue Abx  -Monitor for fevers  -Monitor wbc and absolute neutrophil \  -Plan for I&D today at bedside    History of Present Illness     HPI:  Cat Solis is a 19 y.o. female who presents with recurrent L supraclavicular abscess and neutropenia found incidentally. Patient reports noticing the abscess formation in the left supraclavicular area on Saturday and has reported increasing pain and the growth of the abscess. PCP on Monday started her on doxycycline and discovered anemia and neutropenia when performing routine lab work. Patient reports having an abscess in the same location in the past that drained, was self-resolving without antibiotics. Patient has a history of beta thalassemia minor and recurrent sinus infections. Review of Systems   Constitutional: Negative for chills and fever. Respiratory: Negative for shortness of breath. Cardiovascular: Negative for chest pain. Gastrointestinal: Negative for abdominal pain, constipation, diarrhea, nausea and vomiting.        Historical Information   Past Medical History:   Diagnosis Date   • Migraine      Past Surgical History:   Procedure Laterality Date   • HERNIA REPAIR  2006   • REDUCTION MAMMAPLASTY  07/07/2020     Social History   Social History     Substance and Sexual Activity   Alcohol Use Never     Social History     Substance and Sexual Activity   Drug Use Never     Social History     Tobacco Use   Smoking Status Never   Smokeless Tobacco Never     Family History: non-contributory    Meds/Allergies   all medications and allergies reviewed  No Known Allergies    Objective   First Vitals:   Blood Pressure: 131/83 (07/19/23 1830)  Pulse: 101 (07/19/23 1830)  Temperature: 98 °F (36.7 °C) (07/19/23 1830)  Temp Source: Temporal (07/19/23 1830)  Respirations: 16 (07/19/23 1830)  Height: 5' 7" (170.2 cm) (07/19/23 1830)  Weight - Scale: 86.2 kg (190 lb) (07/19/23 1830)  SpO2: 100 % (07/19/23 1830)    Current Vitals:   Blood Pressure: 131/83 (07/19/23 1830)  Pulse: 72 (07/20/23 0445)  Temperature: 98 °F (36.7 °C) (07/19/23 1830)  Temp Source: Temporal (07/19/23 1830)  Respirations: 16 (07/20/23 0445)  Height: 5' 7" (170.2 cm) (07/19/23 1830)  Weight - Scale: 86.2 kg (190 lb) (07/19/23 1830)  SpO2: 98 % (07/20/23 0445)      Intake/Output Summary (Last 24 hours) at 7/20/2023 0955  Last data filed at 7/20/2023 0100  Gross per 24 hour   Intake 300 ml   Output --   Net 300 ml       Invasive Devices     Peripheral Intravenous Line  Duration           Peripheral IV 07/19/23 Right Antecubital <1 day                Physical Exam:  General: No acute distress  Neuro: Awake, Alert and Oriented x 3  HEENT:  Normocephalic, atraumatic, moist mucous membranes  Neck: Erythematous  2.1 x 0.9 x 1.5 cm Left supraclavicular abscess, tender, minor fluctuance, indurated (see picture below)  CV: Regular rate and rhythm  Lungs: Normal work of breathing, no increased respiratory effort  Abdomen: Soft, non-tender, non-distended.    Extremities: No edema, clubbing or cyanosis  Skin: Warm, dry            Lab Results:   CBC:   Lab Results   Component Value Date    WBC 2.75 (L) 07/20/2023    HGB 9.5 (L) 07/20/2023    HCT 31.3 (L) 07/20/2023    MCV 63 (L) 07/20/2023     07/20/2023    RBC 4.98 07/20/2023    MCH 19.1 (L) 07/20/2023    MCHC 30.4 (L) 07/20/2023    RDW 15.7 (H) 07/20/2023    MPV 10.9 07/20/2023    NRBC 0 07/20/2023   , CMP:   Lab Results   Component Value Date    SODIUM 139 07/20/2023    K 3.6 07/20/2023     (H) 07/20/2023    CO2 24 07/20/2023    BUN 7 07/20/2023 CREATININE 0.56 (L) 07/20/2023    CALCIUM 8.5 07/20/2023    AST 10 07/20/2023    ALT 16 07/20/2023    ALKPHOS 59 07/20/2023    EGFR 134 07/20/2023     Imaging: I have personally reviewed pertinent reports. EKG, Pathology, and Other Studies: I have personally reviewed pertinent reports.       Code Status: Level 1 - Full Code  Advance Directive and Living Will:      Power of :    POLST:      Kaykay Aguilera MD  General Surgery Resident

## 2023-07-20 NOTE — ASSESSMENT & PLAN NOTE
Patient presented with acute on chronic neutropenia, in the setting of left supraclavicular abscess, CBC demonstrated absolute neutrophil count of 0.17 (7/21), physical exam notable for a 2 cm supraclavicular soft tissue abscess tender to palpation with erythema overlying, CT demonstrated the same with reactive cervical lymphadenopathy. Hematology consulted, Etiology unclear at this time, may be due to nutritional/vitamin deficiencies.       Plan:  -Continue oral iron, oral B12  -Avoid strict vegan diet  -Follow-up with hematology in 2 to 4 weeks  -Continue levofloxacin for 7 days

## 2023-07-21 ENCOUNTER — TELEPHONE (OUTPATIENT)
Dept: HEMATOLOGY ONCOLOGY | Facility: CLINIC | Age: 18
End: 2023-07-21

## 2023-07-21 VITALS
WEIGHT: 190 LBS | HEART RATE: 84 BPM | HEIGHT: 67 IN | TEMPERATURE: 98.8 F | SYSTOLIC BLOOD PRESSURE: 98 MMHG | BODY MASS INDEX: 29.82 KG/M2 | OXYGEN SATURATION: 90 % | RESPIRATION RATE: 16 BRPM | DIASTOLIC BLOOD PRESSURE: 61 MMHG

## 2023-07-21 LAB
ANION GAP SERPL CALCULATED.3IONS-SCNC: 6 MMOL/L
BUN SERPL-MCNC: 7 MG/DL (ref 5–25)
CALCIUM SERPL-MCNC: 9.2 MG/DL (ref 8.3–10.1)
CHLORIDE SERPL-SCNC: 109 MMOL/L (ref 96–108)
CO2 SERPL-SCNC: 22 MMOL/L (ref 21–32)
CREAT SERPL-MCNC: 0.64 MG/DL (ref 0.6–1.3)
ERYTHROCYTE [DISTWIDTH] IN BLOOD BY AUTOMATED COUNT: 15.7 % (ref 11.6–15.1)
GFR SERPL CREATININE-BSD FRML MDRD: 128 ML/MIN/1.73SQ M
GLUCOSE SERPL-MCNC: 85 MG/DL (ref 65–140)
HCT VFR BLD AUTO: 34.1 % (ref 34.8–46.1)
HGB BLD-MCNC: 10.1 G/DL (ref 11.5–15.4)
IMM EOSINOPHIL NFR BLD MANUAL: 3 % (ref 0–6)
LYMPHOCYTES NFR BLD: 80 % (ref 14–44)
MCH RBC QN AUTO: 19.2 PG (ref 26.8–34.3)
MCHC RBC AUTO-ENTMCNC: 29.6 G/DL (ref 31.4–37.4)
MCV RBC AUTO: 65 FL (ref 82–98)
MONOCYTES NFR BLD AUTO: 9 % (ref 4–12)
NEUTS SEG NFR BLD AUTO: 6 % (ref 45–77)
NRBC BLD AUTO-RTO: 0 /100 WBCS
PLATELET # BLD AUTO: 383 THOUSANDS/UL (ref 149–390)
PLATELET BLD QL SMEAR: ADEQUATE
PMV BLD AUTO: 11.3 FL (ref 8.9–12.7)
POTASSIUM SERPL-SCNC: 4 MMOL/L (ref 3.5–5.3)
RBC # BLD AUTO: 5.26 MILLION/UL (ref 3.81–5.12)
RBC MORPH BLD: NORMAL
SODIUM SERPL-SCNC: 137 MMOL/L (ref 135–147)
TOTAL CELLS COUNTED SPEC: 100
VANCOMYCIN SERPL-MCNC: 16.3 UG/ML (ref 10–20)
VARIANT LYMPHS BLD QL SMEAR: 2 % (ref 0–0)
WBC # BLD AUTO: 3.31 THOUSAND/UL (ref 4.31–10.16)

## 2023-07-21 PROCEDURE — 99238 HOSP IP/OBS DSCHRG MGMT 30/<: CPT | Performed by: INTERNAL MEDICINE

## 2023-07-21 PROCEDURE — 83516 IMMUNOASSAY NONANTIBODY: CPT

## 2023-07-21 PROCEDURE — 80202 ASSAY OF VANCOMYCIN: CPT | Performed by: INTERNAL MEDICINE

## 2023-07-21 PROCEDURE — 86340 INTRINSIC FACTOR ANTIBODY: CPT

## 2023-07-21 PROCEDURE — NC001 PR NO CHARGE: Performed by: INTERNAL MEDICINE

## 2023-07-21 PROCEDURE — 99255 IP/OBS CONSLTJ NEW/EST HI 80: CPT | Performed by: INTERNAL MEDICINE

## 2023-07-21 PROCEDURE — 85007 BL SMEAR W/DIFF WBC COUNT: CPT

## 2023-07-21 PROCEDURE — 99232 SBSQ HOSP IP/OBS MODERATE 35: CPT | Performed by: STUDENT IN AN ORGANIZED HEALTH CARE EDUCATION/TRAINING PROGRAM

## 2023-07-21 PROCEDURE — 80048 BASIC METABOLIC PNL TOTAL CA: CPT

## 2023-07-21 RX ORDER — CYANOCOBALAMIN 1000 UG/ML
1000 INJECTION, SOLUTION INTRAMUSCULAR; SUBCUTANEOUS ONCE
Status: COMPLETED | OUTPATIENT
Start: 2023-07-21 | End: 2023-07-21

## 2023-07-21 RX ORDER — FERROUS SULFATE 325(65) MG
325 TABLET ORAL
Qty: 30 TABLET | Refills: 0 | Status: SHIPPED | OUTPATIENT
Start: 2023-07-22 | End: 2023-08-14

## 2023-07-21 RX ORDER — LEVOFLOXACIN 750 MG/1
750 TABLET ORAL EVERY 24 HOURS
Qty: 7 TABLET | Refills: 0 | Status: SHIPPED | OUTPATIENT
Start: 2023-07-21 | End: 2023-07-28

## 2023-07-21 RX ORDER — DOXYCYCLINE 100 MG/1
100 TABLET ORAL 2 TIMES DAILY
Qty: 10 TABLET | Refills: 0 | Status: SHIPPED | OUTPATIENT
Start: 2023-07-21 | End: 2023-07-21 | Stop reason: ALTCHOICE

## 2023-07-21 RX ORDER — CYANOCOBALAMIN (VITAMIN B-12) 500 MCG
1000 TABLET ORAL DAILY
Qty: 60 TABLET | Refills: 0 | Status: SHIPPED | OUTPATIENT
Start: 2023-07-21 | End: 2023-08-20

## 2023-07-21 RX ADMIN — CYANOCOBALAMIN 1000 MCG: 1000 INJECTION, SOLUTION INTRAMUSCULAR at 10:34

## 2023-07-21 RX ADMIN — CYANOCOBALAMIN TAB 500 MCG 1000 MCG: 500 TAB at 08:53

## 2023-07-21 RX ADMIN — FERROUS SULFATE TAB 325 MG (65 MG ELEMENTAL FE) 325 MG: 325 (65 FE) TAB at 08:53

## 2023-07-21 NOTE — PROGRESS NOTES
AVS reviewed and discussed with patient and her mother. All questions answered. Patient and mother verbalized understanding. All personal belongings returned. Peripheral IV removed without any difficulties or active bleeding. Mossimo removed and kept in the room per hospital policy. On room air, and in no acute distress. No prescriptions written or given. All Rxs e-prescribed by provider. Patient discharged via wheelchair accompanied by PCA. Patient going home via car with mother.  SD 7/21/2023

## 2023-07-21 NOTE — TELEPHONE ENCOUNTER
New Patient Intake Form   Patient Details:    Yessenia Brochure  2005    Appointment Information   Who is calling to schedule? Gonsalo Casianosuzanna   If not self, what is the caller's name? NA   DID YOU CONFIRM INSURANCE WITH PATIENT? E verified, Routed to finance   Referring provider Dr. Isabel Leon   What is the diagnosis? chronic neutropenia and leukopenia, multifactorial anemia secondary to beta thalassemia minor and iron deficiency anemia of unknown etiology     Is there a confirmed tissue diagnosis? NA     Is there a biopsy ordered or pending? Please specify dates  If yes, route to NN/OCC   NA     Is patient aware of diagnosis? Yes     Have you had any imaging or labs done? If yes, where? (If imaging done outside of [facility], please remind patient to bring a disk.) Yes-[facility], [facility]     If imaging done at outside facility, did you instruct patient to obtain discs and bring to visit? NA   Have you been seen by another Oncologist/Hematologist?  If so, who and where? No   Are the records in [facility] or Care Everywhere? Yes   Does the patient have records at another facility/hospital?    If yes, Name of facility, city and state where facility is located. Yes-[facility]     Did you instruct patient to have records faxed to right[facility] and provide rightfax number? NA   Preferred [facility]   Is the patient willing to be seen by another provider? (This is for breast patients only) NA     Did you send new patient paperwork? Email or mail? NA   Miscellaneous Information: The patient is scheduled for a HFU appointment with William Cable on 8/21 at 1100 in [place] [facility] [place].

## 2023-07-21 NOTE — DISCHARGE SUMMARY
INTERNAL MEDICINE RESIDENCY DISCHARGE SUMMARY     Jason Gage   19 y.o. female  MRN: [id number]  Room/Bed: /MS 18-5     [address]   Encounter: [id number]    Principal Problem:    Neutropenia [facility])  Active Problems:    Left supraclavicular abcess    Thalassemia    Anemia      * Neutropenia [facility])  Assessment & Plan  Patient presented with acute on chronic neutropenia, in the setting of left supraclavicular abscess, CBC demonstrated absolute neutrophil count of 0.17 (7/21), physical exam notable for a 2 cm supraclavicular soft tissue abscess tender to palpation with erythema overlying, CT demonstrated the same with reactive cervical lymphadenopathy. Hematology consulted, Etiology unclear at this time, may be due to nutritional/vitamin deficiencies. Plan:  -Continue oral iron, oral B12  -Avoid strict vegan diet  -Follow-up with hematology in 2 to 4 weeks  -Continue levofloxacin for 7 days    Left supraclavicular abcess  Assessment & Plan  Patient presented with a supraclavicular 2 cm tender neck mass overlying with erythema, present previously back in April which resolved on its own. Was recently started on Abx per her dental team, patient uncertain as to exact reason. She's s/p I/D with surgery team  CT demonstrated a 2cm supraclavicular neck mass with reactive cervical lymphadenopathy. Wound cultures growing gram-negative rods    Plan:  -Continue levofloxacin for 7 days  -Follow-up with PCP for evaluation of incision and drainage site      Anemia  Assessment & Plan  Microcytic anemia, likely secondary to patient's beta thalassemia minor and/or nutritional deficiencies. B12 deficiency, Iron deficiency. Reviewed [facility] labs in care everywhere which indicate chronic anemia and leukopenia.   Discussed patient's care with hematology team    Plan:   -Continue oral iron, continue oral B12      Thalassemia  Assessment & Plan  Was told that she had thalassemia and during a work-up for her breast reduction surgery in 2020. Per care everywhere, smear at that time showed that his hemoglobin A 2 suggestive of beta thalassemia minor. Plan:  Follow-up with the hematology team in 2 to 4 weeks. [facility] [address] COURSE     This is a 70-year-old female with a past medical history of thalassemia minor, who presented to [address] [place] [address] with supraclavicular abscess in the setting of neutropenia. She was sent by her PCP Dr. Kellie Paul. On presentation she was afebrile and hemodynamically stable, work-up revealed leukopenia, anemia, microcytic, neutropenia, absolute neutrophil count of 0.18, iron deficiency, B12 deficiency, otherwise electrolytes within normal limits. CT neck was done which demonstrated a 2 cm supraclavicular abscess, and reactive cervical lymph adenopathy. Viral testing was negative, including hepatitis C, HIV. She was started on vancomycin, and received 1 dosage of cefepime. The surgery team was consulted, and hematology was consulted. She underwent bedside I&D and wound cultures grew gram-negative rods. The hematology oncology team was consulted as well. They recommended vitamin supplementation, iron supplementation, peripheral smear evaluation, neutropenic precautions, avoidance of strict vegan diet. Etiology of hematologic abnormalities remain unclear at this time. On the day of discharge, the patient was feeling well and had no complaints or concerns. She tolerated the I&D well, and was eager to return home. She was sent home with a prescription of levofloxacin for 7 days. She is advised to follow-up with her PCP within 1 to 2 weeks of discharge. She will follow-up with the hematology team in 2 to 4 weeks. At the time of discharge, antibodies were sent for antiparietal antibody, and anti-intrinsic factor antibody.     New medications:  Levofloxacin  Iron  B12    DISCHARGE INFORMATION     PCP at Discharge: Billy Garcia Konstantin Frank MD    Admitting Provider: Lawrence Hernandez MD  Admission Date: 7/19/2023    Discharge Provider: Lawrence Hernandez MD  Discharge Date: 7/21/23    Discharge Disposition: Final discharge disposition not confirmed  Discharge Condition: good  Discharge with Lines: no    Discharge Diet: regular diet  Activity Restrictions: none  Test Results Pending at Discharge: Antiparietal antibody, antiintrinsic factor antibody, copper level, hemoglobin A1c    Discharge Diagnoses:  Principal Problem:    Neutropenia ([address])  Active Problems:    Left supraclavicular abcess    Thalassemia    Anemia  Resolved Problems:    * No resolved hospital problems. *      Consulting Providers:      Diagnostic & Therapeutic Procedures Performed:  CT soft tissue neck with contrast    Result Date: 7/20/2023  Impression: Left-sided supraclavicular abscess, as described above. Workstation performed: [id number]       Code Status: Level 1 - Full Code  Advance Directive & Living Will: <no information>  Power of :    POLST:      Medications:  Current Discharge Medication List      STOP taking these medications       doxycycline hyclate (VIBRA-TABS) 100 mg tablet Comments:   Reason for Stopping:         amoxicillin (AMOXIL) 500 MG tablet Comments:   Reason for Stopping:             Current Discharge Medication List      START taking these medications    Details   ferrous sulfate 325 (65 Fe) mg tablet Take 1 tablet (325 mg total) by mouth daily with breakfast Do not start before July 22, 2023.   Qty: 30 tablet, Refills: 0    Associated Diagnoses: Iron deficiency anemia, unspecified iron deficiency anemia type      levofloxacin (LEVAQUIN) 750 mg tablet Take 1 tablet (750 mg total) by mouth every 24 hours for 7 days  Qty: 7 tablet, Refills: 0    Associated Diagnoses: Abscess, neck      vitamin B-12 (CYANOCOBALAMIN) 500 MCG TABS Take 2 tablets (1,000 mcg total) by mouth daily  Qty: 60 tablet, Refills: 0    Associated Diagnoses: B12 deficiency Current Discharge Medication List      CONTINUE these medications which have NOT CHANGED    Details   Aspirin-Acetaminophen-Caffeine (EXCEDRIN MIGRAINE PO) Take 1 tablet by mouth as needed (headaches)      !! norethindrone-ethinyl estradiol (JUNEL FE 1/20) 1-20 MG-MCG per tablet Take 1 tablet by mouth daily  Qty: 30 tablet, Refills: 5    Associated Diagnoses: Dysmenorrhea      !! norethindrone-ethinyl estradiol (JUNEL FE 1/20) 1-20 MG-MCG per tablet Take 1 tablet by mouth daily  Qty: 90 tablet, Refills: 4    Associated Diagnoses: Surveillance for birth control, oral contraceptives      !! norethindrone-ethinyl estradiol (Junel FE 1/20) 1-20 MG-MCG per tablet Take 1 tablet by mouth daily  Qty: 90 tablet, Refills: 4    Associated Diagnoses: Surveillance for birth control, oral contraceptives      cholecalciferol (VITAMIN D3) 400 units tablet Take 400 Units by mouth daily      cyproheptadine (PERIACTIN) 4 mg tablet Take 4 mg by mouth 3 (three) times a day as needed for allergies      Lidocaine Viscous HCl (XYLOCAINE) 2 % mucosal solution Swish and spit 10 mL 4 (four) times a day as needed for mouth pain or discomfort  Qty: 100 mL, Refills: 1    Associated Diagnoses: Sore throat       !! - Potential duplicate medications found. Please discuss with provider. Allergies:  No Known Allergies    FOLLOW-UP     PCP Outpatient Follow-up:  Dr. Cary Carl MD    Consulting Providers Follow-up:  Follow-up with hematology team in 2 to 4 weeks     Active Issues Requiring Follow-up:   Neutropenia, anemia, leukopenia    Discharge Statement:   I spent 30 minutes minutes discharging the patient. This time was spent on the day of discharge. I had direct contact with the patient on the day of discharge. Additional documentation is required if more than 30 minutes were spent on discharge. Portions of the record may have been created with voice recognition software.   Occasional wrong word or "sound a like" substitutions may have occurred due to the inherent limitations of voice recognition software.   Read the chart carefully and recognize, using context, where substitutions have occurred.    ==  Ousmane Davis MD  [facility]  Internal Medicine Resident PGY-1

## 2023-07-21 NOTE — PROGRESS NOTES
Progress Note -general surgery  : Red surgery Resident on [address] [facility] 19 y.o. female MRN: [id number]  Unit/Bed#: -01 Encounter: [id number]    Assessment:  19 y.o. female with recurrent left supraclavicular abscess and neutropenia, status post I&D 7/20. Plan:  Continue antibiotics  Packing removal today  Monitor for fevers  Follow-up WBC and absolute neutrophil count  Follow-up wound culture and Gram stain 7/20  Regular diet as tolerated  Pain control as needed  antiemetic as needed  Discharge planning, consider transitioning to p.o. antibiotic      Subjective/Objective     Subjective: No acute events overnight. Patient was seen and examined at bedside. Patient expressed no acute concerns for the team today. Patient slept well overnight and her pain was controlled on current regimen. Patient denies nausea vomiting fevers chills and shortness of breath. Objective:     Physical Exam:  GEN: NAD  HEENT: MMM, Left neck tender to palpation, I&D site dressed without drainage. CV: RRR  Lung: Normal effort on room air  Ab: Soft, ND/NT  Extrem: No CCE  Neuro: A+Ox3    Vitals: Temp:  [98.4 °F (36.9 °C)-98.6 °F (37 °C)] 98.4 °F (36.9 °C)  HR:  [78-83] 78  BP: (104-109)/(65-70) 109/70  Body mass index is 29.76 kg/m². I/O       07/19 0701 07/20 0700 07/20 0701 07/21 0700    IV Piggyback 300     Total Intake(mL/kg) 300 (3.5)     Net +300                   Lab, Imaging and other studies: I have personally reviewed pertinent reports.   , CBC with diff:   Lab Results   Component Value Date    WBC 3.31 (L) 07/21/2023    HGB 10.1 (L) 07/21/2023    HCT 34.1 (L) 07/21/2023    MCV 65 (L) 07/21/2023     07/21/2023    RBC 5.26 (H) 07/21/2023    MCH 19.2 (L) 07/21/2023    MCHC 29.6 (L) 07/21/2023    RDW 15.7 (H) 07/21/2023    MPV 11.3 07/21/2023    NRBC 0 07/21/2023   , BMP/CMP:   No results found for: "SODIUM", "K", "CL", "CO2", "ANIONGAP", "BUN", "CREATININE", "GLUCOSE", "CALCIUM", "AST", "ALT", "ALKPHOS", "PROT", "BILITOT", "EGFR"  VTE Pharmacologic Prophylaxis: Reason for no pharmacologic prophylaxis Low risk for DVT  VTE Mechanical Prophylaxis: reason for no mechanical VTE prophylaxis Low risk for DVT    UpdateTime 0554    Deyanira Parker MD  7/21/2023 5:52 AM

## 2023-07-21 NOTE — PROGRESS NOTES
INTERNAL MEDICINE RESIDENCY PROGRESS NOTE     Name: Kel El   Age & Sex: 19 y.o. female   MRN: [id number]  Unit/Bed#: -01   Encounter: [id number]  Team: SOD Team C     PATIENT INFORMATION     Name: Kel El   Age & Sex: 19 y.o. female   MRN: [id number]  Hospital Stay Days: 2    ASSESSMENT/PLAN     Principal Problem:    Neutropenia [facility])  Active Problems:    Left supraclavicular abcess    Thalassemia    Anemia      * Neutropenia [facility])  Assessment & Plan  Patient presents with acute on chronic neutropenia, in the setting of left supraclavicular abscess, CBC demonstrating absolute neutrophil count of 0.17 (7/21), physical exam notable for a 2 cm supraclavicular soft tissue abscess tender to palpation with erythema overlying, CT demonstrating the same with reactive cervical lymphadenopathy. Hematology on board, Etiology unclear at this time, may be due to nutritional/vitamin deficiencies. Plan:  - Peripheral smear follow up  - Copper level follow up  - Heme-onc following, appreciate assistance with care   - follow up vitamin levels   - start Iron supplements   - continue B12  - Neutropenic precautions in place    Left supraclavicular abcess  Assessment & Plan  Patient presents with a supraclavicular 2 cm tender neck mass overlying with erythema, present previously back in April which resolved on its own. Was recently started on Abx per her dental team, patient uncertain as to exact reason. She's s/p I/D with surgery team  CT demonstrated a 2cm supraclavicular neck mass with reactive cervical lymphadenopathy. Plan:  - Continue vancomycin for coverage of soft tissue infection, likely switch to po abx on discharge  - Continue to monitor ANC, WBCs and fever  - f/u wound cultures      Anemia  Assessment & Plan  Microcytic anemia, likely secondary to patient's beta thalassemia minor and/or nutritional deficiencies. B12 deficiency, Iron deficiency.  [facility] which indicate chronic anemia and leukopenia. Hematology following. Plan:   - B12 deficiency newly diagnosed  - Oral iron supplementation started  - Continue B12 1000 mcg daily      Thalassemia  Assessment & Plan  Was told that she had thalassemia and during a work-up for her breast reduction surgery in . Per care everywhere, smear at that time showed that his hemoglobin A 2 suggestive of beta thalassemia minor. Plan:  Outpatient follow-up      Disposition: remain inpatient for IV Abx and monitoring of neutropenia     SUBJECTIVE     Patient seen and examined. No acute events overnight. Patient endorses significant desire to return home. She is feeling well otherwise and offers no complaints. Denies pain surrounding the supraclavicular I/D site. Denies fevers, chills, fatigue. OBJECTIVE     Vitals:    23 0115 23 0445 23 1517 23 2232   BP:   104/65 109/70   BP Location:       Pulse: 94 72 83 78   Resp: 16 16     Temp:   98.6 °F (37 °C) 98.4 °F (36.9 °C)   TempSrc:       SpO2: 97% 98% 96% 97%   Weight:       Height:          Temperature:   Temp (24hrs), Av.5 °F (36.9 °C), Min:98.4 °F (36.9 °C), Max:98.6 °F (37 °C)    Temperature: 98.4 °F (36.9 °C)  Intake & Output:  I/O        07 0700  0701   0700  0701   0700    IV Piggyback 300      Total Intake(mL/kg) 300 (3.5)      Net +300                 Weights:   IBW (Ideal Body Weight): 61.6 kg    Body mass index is 29.76 kg/m². Weight (last 2 days)     Date/Time Weight    23 1830 86.2 (190)        Physical Exam  Vitals and nursing note reviewed. Constitutional:       General: She is not in acute distress. Appearance: She is well-developed. HENT:      Head: Normocephalic and atraumatic.    Eyes:      Conjunctiva/sclera: Conjunctivae normal.   Neck:      Comments: Left supraclavicular abscess now with gauze, clean and dry, mildy tender  Cardiovascular:      Rate and Rhythm: Normal rate and regular rhythm. Heart sounds: No murmur heard. Pulmonary:      Effort: Pulmonary effort is normal. No respiratory distress. Breath sounds: Normal breath sounds. Abdominal:      Palpations: Abdomen is soft. Tenderness: There is no abdominal tenderness. Musculoskeletal:         General: No swelling. Cervical back: Neck supple. Skin:     General: Skin is warm and dry. Capillary Refill: Capillary refill takes less than 2 seconds. Neurological:      General: No focal deficit present. Mental Status: She is alert and oriented to person, place, and time. Psychiatric:         Mood and Affect: Mood normal.       LABORATORY DATA     Labs: I have personally reviewed pertinent reports. Results from last 7 days   Lab Units 07/21/23 0447 07/20/23 0425 07/19/23 2014 07/19/23  0929   WBC Thousand/uL 3.31* 2.75* 3.09* 2.60*   HEMOGLOBIN g/dL 10.1* 9.5* 10.7* 10.6*   HEMATOCRIT % 34.1* 31.3* 35.0 35.7   PLATELETS Thousands/uL 383 344 380 425*   NEUTROS PCT %  --  6* 20* 7*   MONOS PCT %  --  26* 22* 25*   EOS PCT %  --  3 3 4      Results from last 7 days   Lab Units 07/21/23 0447 07/20/23 0425 07/19/23 2014   POTASSIUM mmol/L 4.0 3.6 4.3   CHLORIDE mmol/L 109* 113* 109*   CO2 mmol/L 22 24 24   BUN mg/dL 7 7 8   CREATININE mg/dL 0.64 0.56* 0.74   CALCIUM mg/dL 9.2 8.5 9.5   ALK PHOS U/L  --  59  --    ALT U/L  --  16  --    AST U/L  --  10  --      Results from last 7 days   Lab Units 07/20/23  0425   MAGNESIUM mg/dL 1.8     Results from last 7 days   Lab Units 07/20/23 0425   PHOSPHORUS mg/dL 4.1                    IMAGING & DIAGNOSTIC TESTING     Radiology Results: I have personally reviewed pertinent reports. CT soft tissue neck with contrast    Result Date: 7/20/2023  Impression: Left-sided supraclavicular abscess, as described above. Workstation performed: [id number]     Other Diagnostic Testing: I have personally reviewed pertinent reports.     ACTIVE MEDICATIONS     Current Facility-Administered Medications   Medication Dose Route Frequency   • acetaminophen (TYLENOL) tablet 650 mg  650 mg Oral Once   • cyanocobalamin (VITAMIN B-12) tablet 1,000 mcg  1,000 mcg Oral Daily   • ferrous sulfate tablet 325 mg  325 mg Oral Daily With Breakfast   • vancomycin (VANCOCIN) 1,250 mg in sodium chloride 0.9 % 250 mL IVPB  15 mg/kg Intravenous Q12H       VTE Pharmacologic Prophylaxis: Reason for no pharmacologic prophylaxis not indicated  VTE Mechanical Prophylaxis: reason for no mechanical VTE prophylaxis not indicated    Portions of the record may have been created with voice recognition software. Occasional wrong word or "sound a like" substitutions may have occurred due to the inherent limitations of voice recognition software.   Read the chart carefully and recognize, using context, where substitutions have occurred.  ==  Isabel Bailey MD  [id number] [facility]  Internal Medicine Residency PGY-1

## 2023-07-21 NOTE — PLAN OF CARE
Problem: PAIN - ADULT  Goal: Verbalizes/displays adequate comfort level or baseline comfort level  Description: Interventions:  - Encourage patient to monitor pain and request assistance  - Assess pain using appropriate pain scale  - Administer analgesics based on type and severity of pain and evaluate response  - Implement non-pharmacological measures as appropriate and evaluate response  - Consider cultural and social influences on pain and pain management  - Notify physician/advanced practitioner if interventions unsuccessful or patient reports new pain  Outcome: Progressing     Problem: INFECTION - ADULT  Goal: Absence or prevention of progression during hospitalization  Description: INTERVENTIONS:  - Assess and monitor for signs and symptoms of infection  - Monitor lab/diagnostic results  - Monitor all insertion sites, i.e. indwelling lines, tubes, and drains  - Monitor endotracheal if appropriate and nasal secretions for changes in amount and color  - [place] appropriate cooling/warming therapies per order  - Administer medications as ordered  - Instruct and encourage patient and family to use good hand hygiene technique  - Identify and instruct in appropriate isolation precautions for identified infection/condition  Outcome: Progressing  Goal: Absence of fever/infection during neutropenic period  Description: INTERVENTIONS:  - Monitor WBC    Outcome: Progressing

## 2023-07-22 LAB — PCA AB SER-ACNC: 5.6 UNITS (ref 0–20)

## 2023-07-23 LAB
BACTERIA WND AEROBE CULT: ABNORMAL
GRAM STN SPEC: ABNORMAL
GRAM STN SPEC: ABNORMAL

## 2023-07-24 ENCOUNTER — TRANSITIONAL CARE MANAGEMENT (OUTPATIENT)
Dept: INTERNAL MEDICINE CLINIC | Facility: OTHER | Age: 18
End: 2023-07-24

## 2023-07-24 ENCOUNTER — OFFICE VISIT (OUTPATIENT)
Dept: INTERNAL MEDICINE CLINIC | Age: 18
End: 2023-07-24
Payer: COMMERCIAL

## 2023-07-24 ENCOUNTER — TELEPHONE (OUTPATIENT)
Dept: INTERNAL MEDICINE CLINIC | Age: 18
End: 2023-07-24

## 2023-07-24 VITALS
TEMPERATURE: 97.3 F | HEIGHT: 67 IN | WEIGHT: 188 LBS | SYSTOLIC BLOOD PRESSURE: 118 MMHG | DIASTOLIC BLOOD PRESSURE: 76 MMHG | BODY MASS INDEX: 29.51 KG/M2 | HEART RATE: 114 BPM | OXYGEN SATURATION: 99 %

## 2023-07-24 DIAGNOSIS — D50.8 IRON DEFICIENCY ANEMIA SECONDARY TO INADEQUATE DIETARY IRON INTAKE: ICD-10-CM

## 2023-07-24 DIAGNOSIS — D56.3 THALASSEMIA MINOR: Primary | ICD-10-CM

## 2023-07-24 DIAGNOSIS — E53.8 B12 DEFICIENCY: ICD-10-CM

## 2023-07-24 LAB
BACTERIA WND AEROBE CULT: ABNORMAL
GRAM STN SPEC: ABNORMAL
GRAM STN SPEC: ABNORMAL

## 2023-07-24 PROCEDURE — 99496 TRANSJ CARE MGMT HIGH F2F 7D: CPT | Performed by: INTERNAL MEDICINE

## 2023-07-24 PROCEDURE — 96372 THER/PROPH/DIAG INJ SC/IM: CPT

## 2023-07-24 RX ORDER — CYANOCOBALAMIN 1000 UG/ML
1000 INJECTION, SOLUTION INTRAMUSCULAR; SUBCUTANEOUS ONCE
Status: COMPLETED | OUTPATIENT
Start: 2023-07-24 | End: 2023-07-24

## 2023-07-24 RX ADMIN — CYANOCOBALAMIN 1000 MCG: 1000 INJECTION, SOLUTION INTRAMUSCULAR; SUBCUTANEOUS at 11:54

## 2023-07-24 NOTE — ADDENDUM NOTE
Addended by: Roderick Smith on: 7/24/2023 12:03 PM     Modules accepted: Orders Tinea corporis  lotrisone twice daily x 14 days  Follow up with PCP in 3-5 days  Proceed to  ER if symptoms worsen  Tinea Corporis   WHAT YOU NEED TO KNOW:   Tinea corporis, or ringworm, is a skin infection caused by a fungus  It usually affects the skin on your face, chest, or limbs  Tinea corporis is most common in children and athletes  DISCHARGE INSTRUCTIONS:   Contact your healthcare provider if:   · You have a fever  · Your rash continues to spread after 7 days of treatment  · Your rash is not gone in 2 weeks  · The area around your sore becomes red, warm, tender, swollen, or smells bad  · You have questions or concerns about your condition or care  Medicines:   · Antifungal medicine  may be given as a cream or pill  Take the medicine until it is gone, even if it looks like your infection is gone sooner  · Take your medicine as directed  Contact your healthcare provider if you think your medicine is not helping or if you have side effects  Tell him of her if you are allergic to any medicine  Keep a list of the medicines, vitamins, and herbs you take  Include the amounts, and when and why you take them  Bring the list or the pill bottles to follow-up visits  Carry your medicine list with you in case of an emergency  Follow up with your healthcare provider as directed:  Write down your questions so you remember to ask them during your visits  Prevent the spread of tinea corporis:   · Wash all items that come into contact with infected skin  Wash all towels, clothes, and bedding in hot water  Use laundry soap  Clean shower stalls, mats, and floors with a germ-killing or fungus-killing   · Do not share personal items  Do not share towels, brushes, chairez, or hair accessories  · Keep your skin, hair, and nails clean and dry  Bathe every day, and dry your skin before you put medicine on the infected area  Wash your hands often  Do not scratch your sores   This may cause the infection to spread  · Do not participate in contact sports , such as wrestling, for 72 hours after starting treatment  Talk to your healthcare provider before you participate in contact sports  · Have infected pets treated by a   A patch of missing fur is a sign of infection in a pet  Wear gloves and long sleeves if you handle an infected animal  Always wash your hands after handling the animal  Vacuum your home to remove infected fur or skin flakes  Disinfect surfaces and bedding that your animal comes into contact with  © 2017 [employer] Information is for End User's use only and may not be sold, redistributed or otherwise used for commercial purposes  All illustrations and images included in CareNotes® are the copyrighted property of A D A M , Inc  or [employer]  The above information is an  only  It is not intended as medical advice for individual conditions or treatments  Talk to your doctor, nurse or pharmacist before following any medical regimen to see if it is safe and effective for you

## 2023-07-24 NOTE — PROGRESS NOTES
Assessment/Plan:     Diagnoses and all orders for this visit:    Thalassemia minor  -     CBC and differential; Future    B12 deficiency  -     Vitamin B12; Future    Iron deficiency anemia secondary to inadequate dietary iron intake  -     Iron Panel (Includes Ferritin, Iron Sat%, Iron, and TIBC); Future             M*Modal software was used to dictate this note. It may contain errors with dictating incorrect words or incorrect spelling. Please contact the provider directly with any questions. Subjective:   Chief Complaint   Patient presents with   • Transition of Care Management     TCM d/c 7/21 [facility]- Neutropenia- Patient states she is feeling fine and would like to know if she should do anything for wound        Patient ID: Elizabeth Vallejo is a 19 y.o. female.     HPI  This is a very pleasant 19 years young lady who was seen in the office for left supraclavicular area abscess or infection turned out to be an abscess also initial work-up shows that her WBC count was low and hemoglobin and hematocrit was low she has a history of thalassemia but WBC count was significantly decreased patient was admitted to the hospital for IV antibiotic during the course of hospitalization she was seen by the hematology and they put her on some iron and her B12 levels worse low at 98 and she was given an injection of B12 and started on the B12 tablets I am really not very much sure that we 12 tablets will be able to help her because her eating habits were good and 98 B12 level is significantly on the low side luckily her thyroid functions were normal her blood work-up for intrinsic factor antibodies and parietal cell antibodies are pending I will follow-up with that patient has a follow-up appointment with hematology and also she will see the surgeon in about 1 month I will follow her up in about 1 month to overall she is doing very well  The following portions of the patient's history were reviewed and updated as appropriate: allergies, current medications, past family history, past medical history, past social history, past surgical history and problem list.    Review of Systems   Constitutional: Negative for chills and fatigue. HENT: Negative for congestion, ear pain, hearing loss, postnasal drip, sinus pressure, sore throat and voice change. Eyes: Negative for pain, discharge and visual disturbance. Respiratory: Negative for cough, chest tightness and shortness of breath. Cardiovascular: Negative for chest pain, palpitations and leg swelling. Gastrointestinal: Negative for abdominal pain, blood in stool, diarrhea, nausea and rectal pain. Genitourinary: Negative for difficulty urinating, dysuria and urgency. Musculoskeletal: Negative for arthralgias and joint swelling. Skin: Negative for rash. Allergic/Immunologic: Negative for environmental allergies and food allergies. Neurological: Negative for dizziness, tremors, weakness, numbness and headaches. Hematological: Negative for adenopathy. Psychiatric/Behavioral: Negative for behavioral problems and hallucinations.          Past Medical History:   Diagnosis Date   • Migraine          Current Outpatient Medications:   •  Aspirin-Acetaminophen-Caffeine (EXCEDRIN MIGRAINE PO), Take 1 tablet by mouth as needed (headaches), Disp: , Rfl:   •  ferrous sulfate 325 (65 Fe) mg tablet, Take 1 tablet (325 mg total) by mouth daily with breakfast Do not start before July 22, 2023., Disp: 30 tablet, Rfl: 0  •  levofloxacin (LEVAQUIN) 750 mg tablet, Take 1 tablet (750 mg total) by mouth every 24 hours for 7 days, Disp: 7 tablet, Rfl: 0  •  Lidocaine Viscous HCl (XYLOCAINE) 2 % mucosal solution, Swish and spit 10 mL 4 (four) times a day as needed for mouth pain or discomfort, Disp: 100 mL, Rfl: 1  •  norethindrone-ethinyl estradiol (Junel FE 1/20) 1-20 MG-MCG per tablet, Take 1 tablet by mouth daily, Disp: 90 tablet, Rfl: 4  •  vitamin B-12 (CYANOCOBALAMIN) 500 MCG TABS, Take 2 tablets (1,000 mcg total) by mouth daily, Disp: 60 tablet, Rfl: 0  •  cholecalciferol (VITAMIN D3) 400 units tablet, Take 400 Units by mouth daily, Disp: , Rfl:   •  cyproheptadine (PERIACTIN) 4 mg tablet, Take 4 mg by mouth 3 (three) times a day as needed for allergies, Disp: , Rfl:   •  norethindrone-ethinyl estradiol (JUNEL FE 1/20) 1-20 MG-MCG per tablet, Take 1 tablet by mouth daily, Disp: 30 tablet, Rfl: 5  •  norethindrone-ethinyl estradiol (JUNEL FE 1/20) 1-20 MG-MCG per tablet, Take 1 tablet by mouth daily, Disp: 90 tablet, Rfl: 4    No Known Allergies    Social History   Past Surgical History:   Procedure Laterality Date   • HERNIA REPAIR  2006   • REDUCTION MAMMAPLASTY  07/07/2020     Family History   Problem Relation Age of Onset   • Thyroid cancer Mother    • No Known Problems Sister    • No Known Problems Sister    • No Known Problems Sister    • Hypertension Maternal Grandfather        Objective:  /76 (BP Location: Left arm, Patient Position: Sitting, Cuff Size: Large)   Pulse (!) 114   Temp (!) 97.3 °F (36.3 °C) (Temporal)   Ht 5' 7" (1.702 m)   Wt 85.3 kg (188 lb)   LMP 06/30/2023 (Approximate)   SpO2 99% Comment: room air  BMI 29.44 kg/m²        Physical Exam  Constitutional:       Appearance: She is well-developed. HENT:      Right Ear: External ear normal.   Eyes:      Conjunctiva/sclera: Conjunctivae normal.      Pupils: Pupils are equal, round, and reactive to light. Neck:      Thyroid: No thyromegaly. Vascular: No JVD. Cardiovascular:      Rate and Rhythm: Normal rate and regular rhythm. Heart sounds: Normal heart sounds. Pulmonary:      Breath sounds: Normal breath sounds. Abdominal:      General: Bowel sounds are normal.      Palpations: Abdomen is soft. Musculoskeletal:         General: Normal range of motion. Cervical back: Normal range of motion. Lymphadenopathy:      Cervical: No cervical adenopathy. Skin:     General: Skin is dry. Comments: Small I&D was done on the left supraclavicular area the area is healed and also no redness just the Band-Aid should be enough   Neurological:      General: No focal deficit present. Mental Status: She is alert and oriented to person, place, and time. Mental status is at baseline. Deep Tendon Reflexes: Reflexes are normal and symmetric.    Psychiatric:         Mood and Affect: Mood normal.         Behavior: Behavior normal.

## 2023-07-25 ENCOUNTER — TELEPHONE (OUTPATIENT)
Dept: HEMATOLOGY ONCOLOGY | Facility: CLINIC | Age: 18
End: 2023-07-25

## 2023-07-25 LAB — COPPER SERPL-MCNC: 208 UG/DL (ref 80–158)

## 2023-07-25 NOTE — TELEPHONE ENCOUNTER
I phoned the patient and introduced myself and indicated that I was calling from [facility] regarding her upcoming appointment. Kushal Razo indicated that she is aware of the appointment,  the location of the office, and has the [employer] number, as she uses her [email or web address].

## 2023-07-29 LAB — IF BLOCK AB SER QL RIA: 1.1 AU/ML (ref 0–1.1)

## 2023-08-09 ENCOUNTER — ANNUAL EXAM (OUTPATIENT)
Dept: GYNECOLOGY | Facility: CLINIC | Age: 18
End: 2023-08-09

## 2023-08-09 VITALS
WEIGHT: 193 LBS | SYSTOLIC BLOOD PRESSURE: 122 MMHG | HEIGHT: 67 IN | DIASTOLIC BLOOD PRESSURE: 80 MMHG | BODY MASS INDEX: 30.29 KG/M2

## 2023-08-09 DIAGNOSIS — Z12.39 ENCOUNTER FOR SCREENING BREAST EXAMINATION: ICD-10-CM

## 2023-08-09 DIAGNOSIS — Z30.41 SURVEILLANCE FOR BIRTH CONTROL, ORAL CONTRACEPTIVES: ICD-10-CM

## 2023-08-09 DIAGNOSIS — Z01.419 ENCOUNTER FOR WELL WOMAN EXAM: Primary | ICD-10-CM

## 2023-08-09 RX ORDER — NORETHINDRONE ACETATE AND ETHINYL ESTRADIOL 1MG-20(21)
1 KIT ORAL DAILY
Qty: 90 TABLET | Refills: 4 | Status: SHIPPED | OUTPATIENT
Start: 2023-08-09

## 2023-08-11 NOTE — PROGRESS NOTES
Assessment/Plan:    No problem-specific Assessment & Plan notes found for this encounter. Diagnoses and all orders for this visit:    Encounter for well woman exam    Encounter for screening breast examination    Surveillance for birth control, oral contraceptives  -     norethindrone-ethinyl estradiol (Maxx Pawel FE 1/20) 1-20 MG-MCG per tablet; Take 1 tablet by mouth daily          Subjective:      Patient ID: Andrea Nageotte is a 19 y.o. female. Pt presents for her annual exam today--  She has no complaints  She has regular bleeding   no pelvic pain  On OCP  Bowel and bladder are regular  No breast concerns today      No pap today. rx junel 1/20        The following portions of the patient's history were reviewed and updated as appropriate: allergies, current medications, past family history, past medical history, past social history, past surgical history and problem list.    Review of Systems   Constitutional: Negative for chills, fever and unexpected weight change. Gastrointestinal: Negative for abdominal pain, blood in stool, constipation and diarrhea. Genitourinary: Negative. Objective:      /80   Ht 5' 7" (1.702 m)   Wt 87.5 kg (193 lb)   LMP 06/28/2023 (Approximate)   BMI 30.23 kg/m²          Physical Exam  Vitals and nursing note reviewed. Constitutional:       Appearance: She is well-developed. HENT:      Head: Normocephalic and atraumatic. Chest:   Breasts:     Right: No inverted nipple, mass, nipple discharge or skin change. Left: No inverted nipple, mass, nipple discharge or skin change. Abdominal:      Palpations: Abdomen is soft. Genitourinary:     Exam position: Supine. Labia:         Right: No rash, tenderness or lesion. Left: No rash, tenderness or lesion. Vagina: Normal.      Cervix: No cervical motion tenderness, discharge or friability. Uterus: Normal.       Adnexa:         Right: No mass, tenderness or fullness.           Left: No mass, tenderness or fullness. Musculoskeletal:      Cervical back: Normal range of motion. Lymphadenopathy:      Lower Body: No right inguinal adenopathy. No left inguinal adenopathy.

## 2023-08-12 DIAGNOSIS — D50.9 IRON DEFICIENCY ANEMIA, UNSPECIFIED IRON DEFICIENCY ANEMIA TYPE: ICD-10-CM

## 2023-08-14 ENCOUNTER — TELEPHONE (OUTPATIENT)
Dept: HEMATOLOGY ONCOLOGY | Facility: CLINIC | Age: 18
End: 2023-08-14

## 2023-08-14 RX ORDER — FERROUS SULFATE 325(65) MG
325 TABLET ORAL
Qty: 90 TABLET | Refills: 1 | Status: SHIPPED | OUTPATIENT
Start: 2023-08-14 | End: 2023-09-13

## 2023-08-14 NOTE — TELEPHONE ENCOUNTER
Spoke with patient's mother to let her know that she will need updated labs prior to her appt and there are orders in the system. Patient's mother verbalized understanding and is in agreement with the plan.

## 2023-08-14 NOTE — TELEPHONE ENCOUNTER
Patient Call    Who are you speaking with? Patient and Parent    If it is not the patient, are they listed on an active communication consent form? N/A   What is the reason for this call? Patient & mother, would like to know if patient needs to have labs for her HFU with [facility] 8/21/23   Does this require a call back? Yes   If a call back is required, please list [facility] call back number [phone]   If a call back is required, advise that a message will be forwarded to their care team and someone will return their call as soon as possible. Did you relay this information to the patient?  yes

## 2023-08-15 ENCOUNTER — APPOINTMENT (OUTPATIENT)
Dept: LAB | Age: 18
End: 2023-08-15
Payer: COMMERCIAL

## 2023-08-15 DIAGNOSIS — D56.3 THALASSEMIA MINOR: ICD-10-CM

## 2023-08-15 DIAGNOSIS — D50.8 IRON DEFICIENCY ANEMIA SECONDARY TO INADEQUATE DIETARY IRON INTAKE: ICD-10-CM

## 2023-08-15 DIAGNOSIS — E53.8 B12 DEFICIENCY: ICD-10-CM

## 2023-08-15 LAB
BASOPHILS # BLD AUTO: 0.02 THOUSANDS/ÂΜL (ref 0–0.1)
BASOPHILS NFR BLD AUTO: 1 % (ref 0–1)
EOSINOPHIL # BLD AUTO: 0.15 THOUSAND/ÂΜL (ref 0–0.61)
EOSINOPHIL NFR BLD AUTO: 5 % (ref 0–6)
ERYTHROCYTE [DISTWIDTH] IN BLOOD BY AUTOMATED COUNT: 17.1 % (ref 11.6–15.1)
FERRITIN SERPL-MCNC: 17 NG/ML (ref 11–307)
HCT VFR BLD AUTO: 35.8 % (ref 34.8–46.1)
HGB BLD-MCNC: 10.2 G/DL (ref 11.5–15.4)
IMM GRANULOCYTES # BLD AUTO: 0.01 THOUSAND/UL (ref 0–0.2)
IMM GRANULOCYTES NFR BLD AUTO: 0 % (ref 0–2)
IRON SATN MFR SERPL: 8 % (ref 15–50)
IRON SERPL-MCNC: 29 UG/DL (ref 50–170)
LYMPHOCYTES # BLD AUTO: 1.99 THOUSANDS/ÂΜL (ref 0.6–4.47)
LYMPHOCYTES NFR BLD AUTO: 67 % (ref 14–44)
MCH RBC QN AUTO: 18.8 PG (ref 26.8–34.3)
MCHC RBC AUTO-ENTMCNC: 28.5 G/DL (ref 31.4–37.4)
MCV RBC AUTO: 66 FL (ref 82–98)
MONOCYTES # BLD AUTO: 0.44 THOUSAND/ÂΜL (ref 0.17–1.22)
MONOCYTES NFR BLD AUTO: 15 % (ref 4–12)
NEUTROPHILS # BLD AUTO: 0.34 THOUSANDS/ÂΜL (ref 1.85–7.62)
NEUTS SEG NFR BLD AUTO: 12 % (ref 43–75)
NRBC BLD AUTO-RTO: 0 /100 WBCS
PLATELET # BLD AUTO: 384 THOUSANDS/UL (ref 149–390)
PMV BLD AUTO: 11.2 FL (ref 8.9–12.7)
RBC # BLD AUTO: 5.44 MILLION/UL (ref 3.81–5.12)
TIBC SERPL-MCNC: 370 UG/DL (ref 250–450)
VIT B12 SERPL-MCNC: 351 PG/ML (ref 180–914)
WBC # BLD AUTO: 2.95 THOUSAND/UL (ref 4.31–10.16)

## 2023-08-15 PROCEDURE — 82607 VITAMIN B-12: CPT

## 2023-08-15 PROCEDURE — 83540 ASSAY OF IRON: CPT

## 2023-08-15 PROCEDURE — 82728 ASSAY OF FERRITIN: CPT

## 2023-08-15 PROCEDURE — 85025 COMPLETE CBC W/AUTO DIFF WBC: CPT

## 2023-08-15 PROCEDURE — 36415 COLL VENOUS BLD VENIPUNCTURE: CPT

## 2023-08-15 PROCEDURE — 83550 IRON BINDING TEST: CPT

## 2023-08-16 ENCOUNTER — TELEPHONE (OUTPATIENT)
Dept: INTERNAL MEDICINE CLINIC | Age: 18
End: 2023-08-16

## 2023-08-16 NOTE — TELEPHONE ENCOUNTER
Patient's mother Jenise Habermann is calling regarding the blood work that she just had done.       Very concerned about the results    Please call her back at the mobile phone [phone]

## 2023-08-21 ENCOUNTER — OFFICE VISIT (OUTPATIENT)
Dept: HEMATOLOGY ONCOLOGY | Facility: CLINIC | Age: 18
End: 2023-08-21

## 2023-08-21 VITALS
SYSTOLIC BLOOD PRESSURE: 124 MMHG | HEART RATE: 78 BPM | WEIGHT: 194.2 LBS | BODY MASS INDEX: 30.48 KG/M2 | OXYGEN SATURATION: 100 % | RESPIRATION RATE: 17 BRPM | DIASTOLIC BLOOD PRESSURE: 80 MMHG | TEMPERATURE: 98.2 F | HEIGHT: 67 IN

## 2023-08-21 DIAGNOSIS — D50.8 IRON DEFICIENCY ANEMIA SECONDARY TO INADEQUATE DIETARY IRON INTAKE: ICD-10-CM

## 2023-08-21 DIAGNOSIS — D56.1 BETA-THALASSEMIA (HCC): Primary | ICD-10-CM

## 2023-08-21 DIAGNOSIS — E53.8 VITAMIN B 12 DEFICIENCY: ICD-10-CM

## 2023-08-21 NOTE — PROGRESS NOTES
[facility] [place]  [address]  [facility] [id number] [phone]  Hematology Ambulatory Follow-Up  Wilman Morin, 2005, [id number]  8/21/2023    Assessment/Plan:    1. Beta-thalassemia ([address])  2. Iron deficiency anemia secondary to inadequate dietary iron intake  3. Vitamin B 12 deficiency   Patient is a 20-year-old female with a history of chronic neutropenia, leukopenia, multifactorial anemia likely secondary to beta thalassemia and iron deficiency. She was recently hospitalized secondary to skin abscess requiring antibiotics. Hematology was consulted during admission for further management of acute on chronic neutropenia and leukopenia. WBC was 2.75 on admission, currently 2.95, hemoglobin 9.5, 10.2 currently. MCV in the 60s. Platelets have been normal 380s. ANC 0.34 otherwise normal differential.  Vitamin B12 on 7/19/2023 was 98. She was started on oral supplementation currently it is up to 351. Ferritin was 24 with an iron saturation of 4%. She is started on oral iron which improved saturation to 8%, ferritin is currently 17. Overall she is tolerating oral supplementation without difficulty. She is currently a college student and will be going to [employer] next week. We discussed continuing current oral regimen and we will plan to repeat blood work in 3 months and again in 6 months. We will see her in 6 months and likely discharge from our practice at that time. Patient and her mother verbalized understanding and are in agreement with the plan. - CBC; Standing  - Iron Panel (Includes Ferritin, Iron Sat%, Iron, and TIBC); Standing  - Vitamin B12; Standing  - CBC  - Iron Panel (Includes Ferritin, Iron Sat%, Iron, and TIBC)  - Vitamin B12    Barrier(s) to care: None  The patient is able to self care.     [address] [facility]    Interval history: Clinically stable    Review of Systems   Constitutional: Negative for activity change, appetite change, fatigue, fever and unexpected weight change. Respiratory: Negative for cough and shortness of breath. Cardiovascular: Negative for chest pain and leg swelling. Gastrointestinal: Negative for abdominal pain, constipation, diarrhea and nausea. Endocrine: Negative for cold intolerance and heat intolerance. Musculoskeletal: Negative for arthralgias and myalgias. Skin: Negative. Neurological: Negative for dizziness, weakness and headaches. Hematological: Negative for adenopathy. Does not bruise/bleed easily.        Patient Active Problem List   Diagnosis   • Neutropenia (HCC)   • Left supraclavicular abcess   • Thalassemia   • Anemia       Past Medical History:   Diagnosis Date   • Migraine        Past Surgical History:   Procedure Laterality Date   • HERNIA REPAIR  2006   • REDUCTION MAMMAPLASTY  07/07/2020       Family History   Problem Relation Age of Onset   • Thyroid cancer Mother    • No Known Problems Sister    • No Known Problems Sister    • No Known Problems Sister    • Hypertension Maternal Grandfather        Social History     Socioeconomic History   • Marital status: Single     Spouse name: Not on file   • Number of children: Not on file   • Years of education: Not on file   • Highest education level: Not on file   Occupational History   • Not on file   Tobacco Use   • Smoking status: Never   • Smokeless tobacco: Never   Vaping Use   • Vaping Use: Never used   Substance and Sexual Activity   • Alcohol use: Never   • Drug use: Never   • Sexual activity: Never   Other Topics Concern   • Not on file   Social History Narrative   • Not on file     Social Determinants of Health     Financial Resource Strain: Not on file   Food Insecurity: No Food Insecurity (7/20/2023)    Hunger Vital Sign    • Worried About Running Out of Food in the Last Year: Never true    • Ran Out of Food in the Last Year: Never true Transportation Needs: No Transportation Needs (7/20/2023)    PRAPARE - Transportation    • Lack of Transportation (Medical): No    • Lack of Transportation (Non-Medical):  No   Physical Activity: Not on file   Stress: Not on file   Social Connections: Not on file   Intimate Partner Violence: Not on file   Housing Stability: Unknown (7/20/2023)    Housing Stability Vital Sign    • Unable to Pay for Housing in the Last Year: Not on file    • Number of Places Lived in the Last Year: 1    • Unstable Housing in the Last Year: No         Current Outpatient Medications:   •  Aspirin-Acetaminophen-Caffeine (EXCEDRIN MIGRAINE PO), Take 1 tablet by mouth as needed (headaches), Disp: , Rfl:   •  ferrous sulfate 325 (65 Fe) mg tablet, TAKE 1 TABLET (325 MG TOTAL) BY MOUTH DAILY WITH BREAKFAST DO NOT START BEFORE JULY 22, 2023., Disp: 90 tablet, Rfl: 1  •  norethindrone-ethinyl estradiol (JUNEL FE 1/20) 1-20 MG-MCG per tablet, Take 1 tablet by mouth daily, Disp: 90 tablet, Rfl: 4  •  cholecalciferol (VITAMIN D3) 400 units tablet, Take 400 Units by mouth daily (Patient not taking: Reported on 8/21/2023), Disp: , Rfl:   •  cyproheptadine (PERIACTIN) 4 mg tablet, Take 4 mg by mouth 3 (three) times a day as needed for allergies (Patient not taking: Reported on 8/9/2023), Disp: , Rfl:   •  Lidocaine Viscous HCl (XYLOCAINE) 2 % mucosal solution, Swish and spit 10 mL 4 (four) times a day as needed for mouth pain or discomfort (Patient not taking: Reported on 8/21/2023), Disp: 100 mL, Rfl: 1  •  norethindrone-ethinyl estradiol (JUNEL FE 1/20) 1-20 MG-MCG per tablet, Take 1 tablet by mouth daily (Patient not taking: Reported on 8/21/2023), Disp: 30 tablet, Rfl: 5  •  norethindrone-ethinyl estradiol (Junel FE 1/20) 1-20 MG-MCG per tablet, Take 1 tablet by mouth daily (Patient not taking: Reported on 8/21/2023), Disp: 90 tablet, Rfl: 4  •  vitamin B-12 (CYANOCOBALAMIN) 500 MCG TABS, Take 2 tablets (1,000 mcg total) by mouth daily, Disp: 60 tablet, Rfl: 0    No Known Allergies    Objective:  /80 (BP Location: Left arm, Patient Position: Sitting, Cuff Size: Adult)   Pulse 78   Temp 98.2 °F (36.8 °C)   Resp 17   Ht 5' 7" (1.702 m)   Wt 88.1 kg (194 lb 3.2 oz)   LMP 06/28/2023 (Approximate)   SpO2 100%   BMI 30.42 kg/m²    Physical Exam  Constitutional:       Appearance: Normal appearance. She is well-developed. HENT:      Head: Normocephalic and atraumatic. Eyes:      Conjunctiva/sclera: Conjunctivae normal.      Pupils: Pupils are equal, round, and reactive to light. Pulmonary:      Effort: Pulmonary effort is normal. No respiratory distress. Musculoskeletal:         General: Normal range of motion. Cervical back: Normal range of motion. Lymphadenopathy:      Cervical: No cervical adenopathy. Skin:     General: Skin is dry. Neurological:      Mental Status: She is alert and oriented to person, place, and time.    Psychiatric:         Behavior: Behavior normal.     Result Review  Labs:  Appointment on 08/15/2023   Component Date Value Ref Range Status   • WBC 08/15/2023 2.95 (L)  4.31 - 10.16 Thousand/uL Final   • RBC 08/15/2023 5.44 (H)  3.81 - 5.12 Million/uL Final   • Hemoglobin 08/15/2023 10.2 (L)  11.5 - 15.4 g/dL Final   • Hematocrit 08/15/2023 35.8  34.8 - 46.1 % Final   • MCV 08/15/2023 66 (L)  82 - 98 fL Final   • MCH 08/15/2023 18.8 (L)  26.8 - 34.3 pg Final   • MCHC 08/15/2023 28.5 (L)  31.4 - 37.4 g/dL Final   • RDW 08/15/2023 17.1 (H)  11.6 - 15.1 % Final   • MPV 08/15/2023 11.2  8.9 - 12.7 fL Final   • Platelets 37/12/6201 384  149 - 390 Thousands/uL Final   • nRBC 08/15/2023 0  /100 WBCs Final   • Neutrophils Relative 08/15/2023 12 (L)  43 - 75 % Final   • Immat GRANS % 08/15/2023 0  0 - 2 % Final   • Lymphocytes Relative 08/15/2023 67 (H)  14 - 44 % Final   • Monocytes Relative 08/15/2023 15 (H)  4 - 12 % Final   • Eosinophils Relative 08/15/2023 5  0 - 6 % Final   • Basophils Relative 08/15/2023 1  0 - 1 % Final   • Neutrophils Absolute 08/15/2023 0.34 (L)  1.85 - 7.62 Thousands/µL Final   • Immature Grans Absolute 08/15/2023 0.01  0.00 - 0.20 Thousand/uL Final   • Lymphocytes Absolute 08/15/2023 1.99  0.60 - 4.47 Thousands/µL Final   • Monocytes Absolute 08/15/2023 0.44  0.17 - 1.22 Thousand/µL Final   • Eosinophils Absolute 08/15/2023 0.15  0.00 - 0.61 Thousand/µL Final   • Basophils Absolute 08/15/2023 0.02  0.00 - 0.10 Thousands/µL Final   • Vitamin B-12 08/15/2023 351  180 - 914 pg/mL Final   • Iron Saturation 08/15/2023 8 (L)  15 - 50 % Final   • TIBC 08/15/2023 370  250 - 450 ug/dL Final   • Iron 08/15/2023 29 (L)  50 - 170 ug/dL Final    Patients treated with metal-binding drugs (ie. Deferoxamine) may have depressed iron values. • Ferritin 08/15/2023 17  11 - 307 ng/mL Final     Please note: This report has been generated by a voice recognition software system. Therefore there may be syntax, spelling, and/or grammatical errors. Please call if you have any questions.

## 2023-08-23 ENCOUNTER — OFFICE VISIT (OUTPATIENT)
Dept: INTERNAL MEDICINE CLINIC | Age: 18
End: 2023-08-23
Payer: COMMERCIAL

## 2023-08-23 VITALS
TEMPERATURE: 98.6 F | OXYGEN SATURATION: 98 % | HEART RATE: 90 BPM | WEIGHT: 193.2 LBS | BODY MASS INDEX: 30.32 KG/M2 | DIASTOLIC BLOOD PRESSURE: 78 MMHG | HEIGHT: 67 IN | SYSTOLIC BLOOD PRESSURE: 106 MMHG

## 2023-08-23 DIAGNOSIS — D56.1 BETA-THALASSEMIA (HCC): Primary | ICD-10-CM

## 2023-08-23 DIAGNOSIS — L02.219 CELLULITIS AND ABSCESS OF TRUNK: ICD-10-CM

## 2023-08-23 DIAGNOSIS — E55.9 VITAMIN D DEFICIENCY: ICD-10-CM

## 2023-08-23 DIAGNOSIS — L03.319 CELLULITIS AND ABSCESS OF TRUNK: ICD-10-CM

## 2023-08-23 DIAGNOSIS — D50.8 OTHER IRON DEFICIENCY ANEMIA: ICD-10-CM

## 2023-08-23 DIAGNOSIS — D70.9 NEUTROPENIA, UNSPECIFIED TYPE (HCC): ICD-10-CM

## 2023-08-23 PROCEDURE — 96372 THER/PROPH/DIAG INJ SC/IM: CPT

## 2023-08-23 RX ORDER — LANOLIN ALCOHOL/MO/W.PET/CERES
1000 CREAM (GRAM) TOPICAL DAILY
COMMUNITY

## 2023-08-23 NOTE — PROGRESS NOTES
Assessment/Plan:     Diagnoses and all orders for this visit:    Beta-thalassemia [facility])  Was recently seen by the hematology and they recommend her to get the blood work-up and continue with the iron supplement for office appointment in 6-month  Vitamin D deficiency  -     Vitamin D 25 hydroxy; Future    Neutropenia, unspecified type ([address])  Continue to have neutropenia with no symptoms  Left supraclavicular abcess  Left supraclavicular abscess is resolved  Other iron deficiency anemia  Continue with iron supplement  Other orders  B12 deficiency-     vitamin B-12 (VITAMIN B-12) 1,000 mcg tablet; Take 1,000 mcg by mouth daily        BMI Counseling: Body mass index is 30.26 kg/m². The BMI is above normal. Nutrition recommendations include decreasing portion sizes, encouraging healthy choices of fruits and vegetables and moderation in carbohydrate intake. Exercise recommendations include moderate physical activity 150 minutes/week. Rationale for BMI follow-up plan is due to patient being overweight or obese. [employer] software was used to dictate this note. It may contain errors with dictating incorrect words or incorrect spelling. Please contact the provider directly with any questions. Subjective:   Chief Complaint   Patient presents with   • Follow-up     Anemia. Review labs        Patient ID: Muriel Son is a 19 y.o. female.     Is a very pleasant 19 years young lady who was initially seen in the office for supraclavicular abscess and was admitted to the hospital for leukopenia and anemia found to have a vitamin B12 deficiency and thalassemia patient is doing very well no new complaints review of system is essentially unremarkable she is on tablets of vitamin B12 every day as recommended by the hematology she will continue with the every other day of ferrous sulfate and every day of her vitamin B12 and then she will follow-up with hematology also I will follow her in about 6-month and go from there otherwise she is doing well instructions were given for diet and exercise      The following portions of the patient's history were reviewed and updated as appropriate: allergies, current medications, past family history, past medical history, past surgical history and problem list.    Review of Systems   Constitutional: Negative for chills and fatigue. HENT: Negative for congestion, ear pain, hearing loss, postnasal drip, sinus pressure, sore throat and voice change. Eyes: Negative for pain, discharge and visual disturbance. Respiratory: Negative for cough, chest tightness and shortness of breath. Cardiovascular: Negative for chest pain, palpitations and leg swelling. Gastrointestinal: Negative for abdominal pain, blood in stool, diarrhea, nausea and rectal pain. Genitourinary: Negative for difficulty urinating, dysuria and urgency. Musculoskeletal: Negative for arthralgias and joint swelling. Skin: Negative for rash. Allergic/Immunologic: Negative for environmental allergies and food allergies. Neurological: Negative for dizziness, tremors, weakness, numbness and headaches. Hematological: Negative for adenopathy. Psychiatric/Behavioral: Negative for behavioral problems and hallucinations.          Past Medical History:   Diagnosis Date   • Migraine          Current Outpatient Medications:   •  Aspirin-Acetaminophen-Caffeine (EXCEDRIN MIGRAINE PO), Take 1 tablet by mouth as needed (headaches), Disp: , Rfl:   •  ferrous sulfate 325 (65 Fe) mg tablet, TAKE 1 TABLET (325 MG TOTAL) BY MOUTH DAILY WITH BREAKFAST DO NOT START BEFORE JULY 22, 2023., Disp: 90 tablet, Rfl: 1  •  norethindrone-ethinyl estradiol (JUNEL FE 1/20) 1-20 MG-MCG per tablet, Take 1 tablet by mouth daily, Disp: 90 tablet, Rfl: 4  •  vitamin B-12 (VITAMIN B-12) 1,000 mcg tablet, Take 1,000 mcg by mouth daily, Disp: , Rfl:   •  vitamin B-12 (CYANOCOBALAMIN) 500 MCG TABS, Take 2 tablets (1,000 mcg total) by mouth daily (Patient not taking: Reported on 8/23/2023), Disp: 60 tablet, Rfl: 0    No Known Allergies    Social History   Past Surgical History:   Procedure Laterality Date   • HERNIA REPAIR  2006   • REDUCTION MAMMAPLASTY  07/07/2020     Family History   Problem Relation Age of Onset   • Thyroid cancer Mother    • No Known Problems Sister    • No Known Problems Sister    • No Known Problems Sister    • Hypertension Maternal Grandfather        Objective:  /78 (BP Location: Left arm, Patient Position: Sitting, Cuff Size: Standard)   Pulse 90   Temp 98.6 °F (37 °C) (Temporal)   Ht 5' 7" (1.702 m)   Wt 87.6 kg (193 lb 3.2 oz)   LMP 06/28/2023 (Approximate)   SpO2 98%   BMI 30.26 kg/m²        Physical Exam  Constitutional:       Appearance: She is well-developed. HENT:      Right Ear: External ear normal.   Eyes:      Conjunctiva/sclera: Conjunctivae normal.      Pupils: Pupils are equal, round, and reactive to light. Neck:      Thyroid: No thyromegaly. Vascular: No JVD. Cardiovascular:      Rate and Rhythm: Normal rate and regular rhythm. Heart sounds: Normal heart sounds. Pulmonary:      Breath sounds: Normal breath sounds. Abdominal:      General: Bowel sounds are normal.      Palpations: Abdomen is soft. Musculoskeletal:         General: Normal range of motion. Cervical back: Normal range of motion. Lymphadenopathy:      Cervical: No cervical adenopathy. Skin:     General: Skin is dry. Neurological:      Mental Status: She is alert and oriented to person, place, and time. Deep Tendon Reflexes: Reflexes are normal and symmetric.    Psychiatric:         Behavior: Behavior normal.

## 2023-08-24 RX ORDER — CYANOCOBALAMIN 1000 UG/ML
1000 INJECTION, SOLUTION INTRAMUSCULAR; SUBCUTANEOUS
Status: SHIPPED | OUTPATIENT
Start: 2023-08-23

## 2023-08-24 RX ADMIN — CYANOCOBALAMIN 1000 MCG: 1000 INJECTION, SOLUTION INTRAMUSCULAR; SUBCUTANEOUS at 09:52

## 2023-10-02 ENCOUNTER — OFFICE VISIT (OUTPATIENT)
Dept: INTERNAL MEDICINE CLINIC | Age: 18
End: 2023-10-02
Payer: COMMERCIAL

## 2023-10-02 VITALS
SYSTOLIC BLOOD PRESSURE: 120 MMHG | OXYGEN SATURATION: 99 % | DIASTOLIC BLOOD PRESSURE: 80 MMHG | WEIGHT: 191 LBS | HEIGHT: 67 IN | TEMPERATURE: 98.6 F | HEART RATE: 98 BPM | BODY MASS INDEX: 29.98 KG/M2

## 2023-10-02 DIAGNOSIS — J02.9 SORE THROAT: Primary | ICD-10-CM

## 2023-10-02 LAB
SARS-COV-2 AG UPPER RESP QL IA: NEGATIVE
VALID CONTROL: NORMAL

## 2023-10-02 PROCEDURE — 99213 OFFICE O/P EST LOW 20 MIN: CPT | Performed by: INTERNAL MEDICINE

## 2023-10-02 PROCEDURE — 87811 SARS-COV-2 COVID19 W/OPTIC: CPT | Performed by: INTERNAL MEDICINE

## 2023-10-02 RX ORDER — CETIRIZINE HYDROCHLORIDE, PSEUDOEPHEDRINE HYDROCHLORIDE 5; 120 MG/1; MG/1
1 TABLET, FILM COATED, EXTENDED RELEASE ORAL 2 TIMES DAILY
Qty: 15 TABLET | Refills: 1 | Status: SHIPPED | OUTPATIENT
Start: 2023-10-02

## 2023-10-02 RX ORDER — AZITHROMYCIN 250 MG/1
TABLET, FILM COATED ORAL
Qty: 6 TABLET | Refills: 0 | Status: SHIPPED | OUTPATIENT
Start: 2023-10-02 | End: 2023-10-07

## 2023-10-02 NOTE — PROGRESS NOTES
Assessment/Plan:      Diagnoses and all orders for this visit:    Sore throat  -     POCT Rapid Covid Ag  -     azithromycin (Zithromax) 250 mg tablet; Take 2 tablets (500 mg total) by mouth daily for 1 day, THEN 1 tablet (250 mg total) daily for 4 days. -     cetirizine-pseudoephedrine (ZyrTEC-D) 5-120 MG per tablet; Take 1 tablet by mouth 2 (two) times a day               M*Modal software was used to dictate this note. It may contain errors with dictating incorrect words or incorrect spelling. Please contact the provider directly with any questions. Subjective:   Chief Complaint   Patient presents with   • Sore Throat     Body Aches,Cold,Headaches,Sore Throat left side , NEGATIVE FOR COVID    • Earache        Patient ID: Carley Chris is a 19 y.o. female. HPI    The following portions of the patient's history were reviewed and updated as appropriate: allergies, current medications, past family history, past medical history, past social history, past surgical history and problem list.    Review of Systems   HENT: Positive for congestion, sore throat and trouble swallowing. Negative for drooling, ear discharge and ear pain. Respiratory: Positive for cough. Negative for shortness of breath and stridor. Gastrointestinal: Negative for abdominal pain, diarrhea and vomiting. Musculoskeletal: Positive for neck pain. Neurological: Positive for headaches.          Past Medical History:   Diagnosis Date   • Migraine          Current Outpatient Medications:   •  Aspirin-Acetaminophen-Caffeine (EXCEDRIN MIGRAINE PO), Take 1 tablet by mouth as needed (headaches), Disp: , Rfl:   •  ferrous sulfate 325 (65 Fe) mg tablet, TAKE 1 TABLET (325 MG TOTAL) BY MOUTH DAILY WITH BREAKFAST DO NOT START BEFORE JULY 22, 2023., Disp: 90 tablet, Rfl: 1  •  norethindrone-ethinyl estradiol (JUNEL FE 1/20) 1-20 MG-MCG per tablet, Take 1 tablet by mouth daily, Disp: 90 tablet, Rfl: 4  •  vitamin B-12 (CYANOCOBALAMIN) 500 MCG TABS, Take 2 tablets (1,000 mcg total) by mouth daily, Disp: 60 tablet, Rfl: 0    Current Facility-Administered Medications:   •  cyanocobalamin injection 1,000 mcg, 1,000 mcg, Intramuscular, Q30 Days, Shannon Barrett MD, 1,000 mcg at 08/24/23 2221    No Known Allergies    Social History   Past Surgical History:   Procedure Laterality Date   • HERNIA REPAIR  2006   • REDUCTION MAMMAPLASTY  07/07/2020     Family History   Problem Relation Age of Onset   • Thyroid cancer Mother    • No Known Problems Sister    • No Known Problems Sister    • No Known Problems Sister    • Hypertension Maternal Grandfather        Objective:  /80 (BP Location: Left arm, Patient Position: Sitting, Cuff Size: Standard)   Pulse 98   Temp 98.6 °F (37 °C) (Temporal)   Ht 5' 7" (1.702 m)   Wt 86.6 kg (191 lb)   SpO2 99%   BMI 29.91 kg/m²        Physical Exam  Constitutional:       Appearance: She is well-developed. HENT:      Head: Normocephalic and atraumatic. Right Ear: Tympanic membrane and external ear normal.      Left Ear: Tympanic membrane and external ear normal.      Nose: Mucosal edema and congestion present. Right Turbinates: Enlarged. Mouth/Throat:      Tongue: Lesions present. Pharynx: Posterior oropharyngeal erythema and uvula swelling present. Tonsils: No tonsillar exudate or tonsillar abscesses. Eyes:      Pupils: Pupils are equal, round, and reactive to light. Cardiovascular:      Rate and Rhythm: Normal rate and regular rhythm. Heart sounds: Normal heart sounds. Pulmonary:      Effort: Pulmonary effort is normal. No respiratory distress. Breath sounds: Normal breath sounds. Musculoskeletal:      Cervical back: Normal range of motion and neck supple. Skin:     General: Skin is warm and dry. Neurological:      Mental Status: She is alert and oriented to person, place, and time.

## 2023-12-11 ENCOUNTER — APPOINTMENT (OUTPATIENT)
Dept: LAB | Age: 18
End: 2023-12-11
Payer: COMMERCIAL

## 2023-12-11 DIAGNOSIS — E55.9 VITAMIN D DEFICIENCY: ICD-10-CM

## 2023-12-11 LAB
25(OH)D3 SERPL-MCNC: 18.8 NG/ML (ref 30–100)
ERYTHROCYTE [DISTWIDTH] IN BLOOD BY AUTOMATED COUNT: 17.9 % (ref 11.6–15.1)
FERRITIN SERPL-MCNC: 22 NG/ML (ref 11–307)
HCT VFR BLD AUTO: 36 % (ref 34.8–46.1)
HGB BLD-MCNC: 10.8 G/DL (ref 11.5–15.4)
IRON SATN MFR SERPL: 25 % (ref 15–50)
IRON SERPL-MCNC: 97 UG/DL (ref 50–212)
MCH RBC QN AUTO: 19.5 PG (ref 26.8–34.3)
MCHC RBC AUTO-ENTMCNC: 30 G/DL (ref 31.4–37.4)
MCV RBC AUTO: 65 FL (ref 82–98)
PLATELET # BLD AUTO: 364 THOUSANDS/UL (ref 149–390)
RBC # BLD AUTO: 5.55 MILLION/UL (ref 3.81–5.12)
TIBC SERPL-MCNC: 382 UG/DL (ref 250–450)
UIBC SERPL-MCNC: 285 UG/DL (ref 155–355)
VIT B12 SERPL-MCNC: 347 PG/ML (ref 180–914)
WBC # BLD AUTO: 3.24 THOUSAND/UL (ref 4.31–10.16)

## 2023-12-11 PROCEDURE — 82306 VITAMIN D 25 HYDROXY: CPT

## 2023-12-11 PROCEDURE — 36415 COLL VENOUS BLD VENIPUNCTURE: CPT

## 2023-12-18 ENCOUNTER — VBI (OUTPATIENT)
Dept: ADMINISTRATIVE | Facility: OTHER | Age: 18
End: 2023-12-18

## 2024-01-10 ENCOUNTER — TELEPHONE (OUTPATIENT)
Age: 19
End: 2024-01-10

## 2024-01-10 NOTE — TELEPHONE ENCOUNTER
Received call from [facility] requesting for an insurance referral for her appointment on 1/15/2024. They did not provide a diagnosis code but stated it is for iron deficiency anemia.

## 2024-01-15 ENCOUNTER — OFFICE VISIT (OUTPATIENT)
Dept: HEMATOLOGY ONCOLOGY | Facility: CLINIC | Age: 19
End: 2024-01-15
Payer: COMMERCIAL

## 2024-01-15 VITALS
RESPIRATION RATE: 17 BRPM | OXYGEN SATURATION: 94 % | SYSTOLIC BLOOD PRESSURE: 128 MMHG | HEIGHT: 67 IN | TEMPERATURE: 98.2 F | HEART RATE: 97 BPM | DIASTOLIC BLOOD PRESSURE: 72 MMHG | WEIGHT: 185 LBS | BODY MASS INDEX: 29.03 KG/M2

## 2024-01-15 DIAGNOSIS — D56.1 BETA-THALASSEMIA (HCC): Primary | ICD-10-CM

## 2024-01-15 DIAGNOSIS — D50.8 OTHER IRON DEFICIENCY ANEMIA: ICD-10-CM

## 2024-01-15 DIAGNOSIS — E53.8 VITAMIN B 12 DEFICIENCY: ICD-10-CM

## 2024-01-15 DIAGNOSIS — D70.9 NEUTROPENIA, UNSPECIFIED TYPE (HCC): ICD-10-CM

## 2024-01-15 PROCEDURE — 99213 OFFICE O/P EST LOW 20 MIN: CPT | Performed by: NURSE PRACTITIONER

## 2024-01-15 RX ORDER — OMEGA-3S/DHA/EPA/FISH OIL/D3 300MG-1000
5000 CAPSULE ORAL DAILY
COMMUNITY

## 2024-01-15 RX ORDER — OXYCODONE HYDROCHLORIDE AND ACETAMINOPHEN 5; 325 MG/1; MG/1
1 TABLET ORAL EVERY 6 HOURS PRN
COMMUNITY
Start: 2023-12-14 | End: 2024-01-15 | Stop reason: ALTCHOICE

## 2024-01-15 NOTE — PROGRESS NOTES
[facility] HEMATOLOGY ONCOLOGY SPECIALISTS [place]  [address] [address]  [phone]  Hematology Ambulatory Follow-Up  Alejandra Velasquez, 2005, [id number]  1/15/2024    Assessment/Plan:    1. Beta-thalassemia (HCC)  2. Neutropenia, unspecified type (HCC)  3. Other iron deficiency anemia  4. Vitamin B 12 deficiency  Patient is a 18-year-old female with a history of chronic neutropenia, leukopenia, anemia secondary to beta thalassemia.  Overall patient is able to function without restriction.  Most recent blood work shows a hemoglobin of 10.8, MCV 65, consistent with beta thalassemia.  WBC 3.24, normal platelets.  Vitamin B12 level has improved from 98 in July to 347.  Patient continues on oral B12 supplement 1000 mcg once daily.  Iron saturation has improved from 4% to 25%, ferritin level 22.  Patient takes oral iron 325 mg every other day, tolerating without difficulty.   Patient is a college student at [employer].  We reviewed to continue oral supplements as she appears to be tolerating and numbers have improved.  She denies fevers, chills, frequent infections, weight loss, drenching night sweats.  From a hematologic standpoint we will see her on a as needed basis.  I advise she continue to follow with her PCP but may be referred back if indicated.  Patient and her mother verbalized understanding and are in agreement with the plan.    Barrier(s) to care: None  The patient is able to self care.    Raysa LEE  Medical Oncology/Hematology  [facility]    Interval history: clinically stable    Review of Systems   Constitutional:  Negative for activity change, appetite change, fatigue, fever and unexpected weight change.   Respiratory:  Negative for cough and shortness of breath.    Cardiovascular:  Negative for chest pain and leg swelling.   Gastrointestinal:  Negative for abdominal pain, constipation, diarrhea and nausea.   Endocrine:  "Negative for cold intolerance and heat intolerance.   Musculoskeletal:  Negative for arthralgias and myalgias.   Skin: Negative.    Neurological:  Negative for dizziness, weakness and headaches.   Hematological:  Negative for adenopathy. Does not bruise/bleed easily.     Patient Active Problem List   Diagnosis    Neutropenia (HCC)    Left supraclavicular abcess    Thalassemia    Anemia    Vitamin B 12 deficiency     Past Medical History:   Diagnosis Date    Migraine      Past Surgical History:   Procedure Laterality Date    HERNIA REPAIR  2006    REDUCTION MAMMAPLASTY  07/07/2020     Current Outpatient Medications:     Aspirin-Acetaminophen-Caffeine (EXCEDRIN MIGRAINE PO), Take 1 tablet by mouth as needed (headaches), Disp: , Rfl:     cetirizine-pseudoephedrine (ZyrTEC-D) 5-120 MG per tablet, Take 1 tablet by mouth 2 (two) times a day, Disp: 15 tablet, Rfl: 1    norethindrone-ethinyl estradiol (JUNEL FE 1/20) 1-20 MG-MCG per tablet, Take 1 tablet by mouth daily, Disp: 90 tablet, Rfl: 4    cholecalciferol (VITAMIN D3) 400 units tablet, Take 5,000 Units by mouth daily, Disp: , Rfl:     ferrous sulfate 325 (65 Fe) mg tablet, TAKE 1 TABLET (325 MG TOTAL) BY MOUTH DAILY WITH BREAKFAST DO NOT START BEFORE JULY 22, 2023., Disp: 90 tablet, Rfl: 1    vitamin B-12 (CYANOCOBALAMIN) 500 MCG TABS, Take 2 tablets (1,000 mcg total) by mouth daily, Disp: 60 tablet, Rfl: 0    Current Facility-Administered Medications:     cyanocobalamin injection 1,000 mcg, 1,000 mcg, Intramuscular, Q30 Days, Greg Salcedo MD, 1,000 mcg at 08/24/23 0952    No Known Allergies    Objective:  /72 (BP Location: Left arm, Patient Position: Sitting, Cuff Size: Adult)   Pulse 97   Temp 98.2 °F (36.8 °C)   Resp 17   Ht 5' 7\" (1.702 m)   Wt 83.9 kg (185 lb)   SpO2 94%   BMI 28.98 kg/m²    Physical Exam  Vitals reviewed.   Constitutional:       Appearance: Normal appearance. She is well-developed.   HENT:      Head: Normocephalic and " atraumatic.   Eyes:      Conjunctiva/sclera: Conjunctivae normal.      Pupils: Pupils are equal, round, and reactive to light.   Pulmonary:      Effort: Pulmonary effort is normal. No respiratory distress.   Musculoskeletal:         General: Normal range of motion.      Cervical back: Normal range of motion.   Lymphadenopathy:      Cervical: No cervical adenopathy.   Skin:     General: Skin is dry.   Neurological:      Mental Status: She is alert and oriented to person, place, and time.   Psychiatric:         Behavior: Behavior normal.     Result Review  Labs:  No visits with results within 1 Month(s) from this visit.   Latest known visit with results is:   Appointment on 12/11/2023   Component Date Value Ref Range Status    Vit D, 25-Hydroxy 12/11/2023 18.8 (L)  30.0 - 100.0 ng/mL Final    Vitamin D guidelines established by Clinical Guidelines Subcommittee  of the Endocrine Society Task Force, 2011    Deficiency <20ng/ml   Insufficiency 20-30ng/ml   Sufficient  ng/ml      Please note:  This report has been generated by a voice recognition software system. Therefore there may be syntax, spelling, and/or grammatical errors. Please call if you have any questions.

## 2024-03-12 ENCOUNTER — OFFICE VISIT (OUTPATIENT)
Dept: INTERNAL MEDICINE CLINIC | Age: 19
End: 2024-03-12
Payer: COMMERCIAL

## 2024-03-12 VITALS
WEIGHT: 186.2 LBS | HEIGHT: 67 IN | HEART RATE: 98 BPM | TEMPERATURE: 98.6 F | BODY MASS INDEX: 29.22 KG/M2 | OXYGEN SATURATION: 98 % | SYSTOLIC BLOOD PRESSURE: 110 MMHG | DIASTOLIC BLOOD PRESSURE: 78 MMHG

## 2024-03-12 DIAGNOSIS — D70.9 NEUTROPENIA, UNSPECIFIED TYPE (HCC): ICD-10-CM

## 2024-03-12 DIAGNOSIS — E55.9 VITAMIN D DEFICIENCY: ICD-10-CM

## 2024-03-12 DIAGNOSIS — D56.1 BETA-THALASSEMIA (HCC): Primary | ICD-10-CM

## 2024-03-12 DIAGNOSIS — E53.8 VITAMIN B 12 DEFICIENCY: ICD-10-CM

## 2024-03-12 PROCEDURE — 99214 OFFICE O/P EST MOD 30 MIN: CPT | Performed by: INTERNAL MEDICINE

## 2024-03-12 NOTE — PROGRESS NOTES
Assessment/Plan:     Diagnoses and all orders for this visit:    Vitamin B 12 deficiency  Better  Neutropenia, unspecified type (HCC)  WBC count is better  Beta-thalassemia (HCC)    No iron deficiency anymore       [employer] software was used to dictate this note.  It may contain errors with dictating incorrect words or incorrect spelling. Please contact the provider directly with any questions.    Subjective:   Chief Complaint   Patient presents with    Follow-up        Patient ID: Alejandra Velasquez is a 18 y.o. female.    Is a very pleasant 18 years young lady who is here today for the regular follow-up she was recently diagnosed with beta thalassemia also her other sister was also diagnosed the same time with the beta thalassemia because of some neutropenia, iron deficiency and also B12 deficiency patient was seen by the hematology the workup also shows a vitamin D deficiency patient is on replacements    B12 levels are back to normal from 98 to over 300    Iron and ferritin levels are better than before she is taking iron every other day    Leukopenia is a stable no signs or symptoms of infection    Review of system is essentially unremarkable patient will be seen by me in about 6 months unless any problem        The following portions of the patient's history were reviewed and updated as appropriate: allergies, current medications, past family history, past medical history, past social history, past surgical history, and problem list.    Review of Systems   Constitutional:  Negative for chills and fatigue.   HENT:  Negative for congestion, ear pain, hearing loss, postnasal drip, sinus pressure, sore throat and voice change.    Eyes:  Negative for pain, discharge and visual disturbance.   Respiratory:  Negative for cough, chest tightness and shortness of breath.    Cardiovascular:  Negative for chest pain, palpitations and leg swelling.   Gastrointestinal:  Negative for abdominal pain, blood in stool, diarrhea, nausea  and rectal pain.   Genitourinary:  Negative for difficulty urinating, dysuria and urgency.   Musculoskeletal:  Negative for arthralgias and joint swelling.   Skin:  Negative for rash.   Allergic/Immunologic: Negative for environmental allergies and food allergies.   Neurological:  Negative for dizziness, tremors, weakness, numbness and headaches.   Hematological:  Negative for adenopathy.   Psychiatric/Behavioral:  Negative for behavioral problems and hallucinations.          Past Medical History:   Diagnosis Date    Migraine          Current Outpatient Medications:     Aspirin-Acetaminophen-Caffeine (EXCEDRIN MIGRAINE PO), Take 1 tablet by mouth as needed (headaches), Disp: , Rfl:     Cholecalciferol (VITAMIN D3 PO), Take 5,000 Units by mouth daily, Disp: , Rfl:     ferrous sulfate 325 (65 Fe) mg tablet, TAKE 1 TABLET (325 MG TOTAL) BY MOUTH DAILY WITH BREAKFAST DO NOT START BEFORE JULY 22, 2023. (Patient taking differently: Take 325 mg by mouth Every other day), Disp: 90 tablet, Rfl: 1    norethindrone-ethinyl estradiol (JUNEL FE 1/20) 1-20 MG-MCG per tablet, Take 1 tablet by mouth daily, Disp: 90 tablet, Rfl: 4    vitamin B-12 (CYANOCOBALAMIN) 500 MCG TABS, Take 2 tablets (1,000 mcg total) by mouth daily, Disp: 60 tablet, Rfl: 0    Current Facility-Administered Medications:     cyanocobalamin injection 1,000 mcg, 1,000 mcg, Intramuscular, Q30 Days, Greg Salcedo MD, 1,000 mcg at 08/24/23 0952    No Known Allergies    Social History   Past Surgical History:   Procedure Laterality Date    HERNIA REPAIR  2006    REDUCTION MAMMAPLASTY  07/07/2020    WISDOM TOOTH EXTRACTION  12/2023     Family History   Problem Relation Age of Onset    Thyroid cancer Mother     No Known Problems Sister     No Known Problems Sister     No Known Problems Sister     Hypertension Maternal Grandfather        Objective:  /78 (BP Location: Left arm, Patient Position: Sitting, Cuff Size: Standard)   Pulse 98   Temp 98.6 °F (37 °C)  "(Temporal)   Ht 5' 7\" (1.702 m)   Wt 84.5 kg (186 lb 3.2 oz)   SpO2 98%   BMI 29.16 kg/m²        Physical Exam  Constitutional:       Appearance: Normal appearance. She is well-developed.   HENT:      Head: Normocephalic and atraumatic.      Mouth/Throat:      Mouth: Mucous membranes are moist.   Eyes:      Conjunctiva/sclera: Conjunctivae normal.      Pupils: Pupils are equal, round, and reactive to light.   Cardiovascular:      Rate and Rhythm: Normal rate and regular rhythm.   Pulmonary:      Effort: Pulmonary effort is normal.      Breath sounds: Normal breath sounds.   Musculoskeletal:      Cervical back: Normal range of motion and neck supple.   Neurological:      Mental Status: She is alert.           "

## 2024-06-27 ENCOUNTER — TELEPHONE (OUTPATIENT)
Age: 19
End: 2024-06-27

## 2024-06-27 NOTE — TELEPHONE ENCOUNTER
Patient needs a TB test for school. She has an appointment with Dr Salcedo on 8/15 and would like to come in 8/13 to get it placed so that it may be read on the 15th.    Please place order and call patient to schedule.

## 2024-08-08 ENCOUNTER — APPOINTMENT (OUTPATIENT)
Dept: LAB | Age: 19
End: 2024-08-08
Payer: COMMERCIAL

## 2024-08-08 DIAGNOSIS — E55.9 VITAMIN D DEFICIENCY: ICD-10-CM

## 2024-08-08 DIAGNOSIS — D56.1 BETA-THALASSEMIA (HCC): ICD-10-CM

## 2024-08-08 DIAGNOSIS — D70.9 NEUTROPENIA, UNSPECIFIED TYPE (HCC): ICD-10-CM

## 2024-08-08 LAB
25(OH)D3 SERPL-MCNC: 57.6 NG/ML (ref 30–100)
BASOPHILS # BLD AUTO: 0.01 THOUSANDS/ΜL (ref 0–0.1)
BASOPHILS NFR BLD AUTO: 0 % (ref 0–1)
EOSINOPHIL # BLD AUTO: 0.09 THOUSAND/ΜL (ref 0–0.61)
EOSINOPHIL NFR BLD AUTO: 3 % (ref 0–6)
ERYTHROCYTE [DISTWIDTH] IN BLOOD BY AUTOMATED COUNT: 16.1 % (ref 11.6–15.1)
HCT VFR BLD AUTO: 33.7 % (ref 34.8–46.1)
HGB BLD-MCNC: 10.1 G/DL (ref 11.5–15.4)
IMM GRANULOCYTES # BLD AUTO: 0 THOUSAND/UL (ref 0–0.2)
IMM GRANULOCYTES NFR BLD AUTO: 0 % (ref 0–2)
LYMPHOCYTES # BLD AUTO: 2.27 THOUSANDS/ΜL (ref 0.6–4.47)
LYMPHOCYTES NFR BLD AUTO: 73 % (ref 14–44)
MCH RBC QN AUTO: 19.7 PG (ref 26.8–34.3)
MCHC RBC AUTO-ENTMCNC: 30 G/DL (ref 31.4–37.4)
MCV RBC AUTO: 66 FL (ref 82–98)
MONOCYTES # BLD AUTO: 0.61 THOUSAND/ΜL (ref 0.17–1.22)
MONOCYTES NFR BLD AUTO: 19 % (ref 4–12)
NEUTROPHILS # BLD AUTO: 0.16 THOUSANDS/ΜL (ref 1.85–7.62)
NEUTS SEG NFR BLD AUTO: 5 % (ref 43–75)
NRBC BLD AUTO-RTO: 0 /100 WBCS
PLATELET # BLD AUTO: 341 THOUSANDS/UL (ref 149–390)
PMV BLD AUTO: 11.9 FL (ref 8.9–12.7)
RBC # BLD AUTO: 5.12 MILLION/UL (ref 3.81–5.12)
WBC # BLD AUTO: 3.14 THOUSAND/UL (ref 4.31–10.16)

## 2024-08-08 PROCEDURE — 36415 COLL VENOUS BLD VENIPUNCTURE: CPT

## 2024-08-08 PROCEDURE — 82306 VITAMIN D 25 HYDROXY: CPT

## 2024-08-08 PROCEDURE — 85025 COMPLETE CBC W/AUTO DIFF WBC: CPT

## 2024-08-12 ENCOUNTER — ANNUAL EXAM (OUTPATIENT)
Dept: GYNECOLOGY | Facility: CLINIC | Age: 19
End: 2024-08-12
Payer: COMMERCIAL

## 2024-08-12 VITALS
DIASTOLIC BLOOD PRESSURE: 70 MMHG | WEIGHT: 185.4 LBS | SYSTOLIC BLOOD PRESSURE: 118 MMHG | BODY MASS INDEX: 29.1 KG/M2 | HEIGHT: 67 IN

## 2024-08-12 DIAGNOSIS — Z30.41 SURVEILLANCE FOR BIRTH CONTROL, ORAL CONTRACEPTIVES: ICD-10-CM

## 2024-08-12 DIAGNOSIS — Z12.4 CERVICAL CANCER SCREENING: ICD-10-CM

## 2024-08-12 DIAGNOSIS — Z01.419 ENCOUNTER FOR WELL WOMAN EXAM: Primary | ICD-10-CM

## 2024-08-12 DIAGNOSIS — Z01.419 ROUTINE GYNECOLOGICAL EXAMINATION: ICD-10-CM

## 2024-08-12 DIAGNOSIS — Z12.39 ENCOUNTER FOR SCREENING BREAST EXAMINATION: ICD-10-CM

## 2024-08-12 PROCEDURE — S0612 ANNUAL GYNECOLOGICAL EXAMINA: HCPCS | Performed by: PHYSICIAN ASSISTANT

## 2024-08-13 ENCOUNTER — CLINICAL SUPPORT (OUTPATIENT)
Dept: INTERNAL MEDICINE CLINIC | Age: 19
End: 2024-08-13
Payer: COMMERCIAL

## 2024-08-13 DIAGNOSIS — Z11.1 SCREENING FOR TUBERCULOSIS: Primary | ICD-10-CM

## 2024-08-13 PROCEDURE — 86580 TB INTRADERMAL TEST: CPT

## 2024-08-13 RX ORDER — NORETHINDRONE ACETATE AND ETHINYL ESTRADIOL 1MG-20(21)
1 KIT ORAL DAILY
Qty: 90 TABLET | Refills: 4 | Status: SHIPPED | OUTPATIENT
Start: 2024-08-13

## 2024-08-13 NOTE — PROGRESS NOTES
Assessment/Plan:      Diagnoses and all orders for this visit:    Encounter for well woman exam    Encounter for screening breast examination    Cervical cancer screening    Routine gynecological examination  -     Pap Lb (Liquid-based)    Surveillance for birth control, oral contraceptives          Subjective:     Patient ID: Alejandra Velasquez is a 19 y.o. female.    Pt presents for her annual exam today--  She has no complaints  She has regular, light bleeding   no pelvic pain  On OCP  Bowel and bladder are regular  No breast concerns today    1st pap today.    Rx junel 1/20  Daily mvi        Review of Systems   Constitutional:  Negative for chills, fever and unexpected weight change.   HENT:  Negative for ear pain and sore throat.    Eyes:  Negative for pain and visual disturbance.   Respiratory:  Negative for cough and shortness of breath.    Cardiovascular:  Negative for chest pain and palpitations.   Gastrointestinal:  Negative for abdominal pain, blood in stool, constipation, diarrhea and vomiting.   Genitourinary: Negative.  Negative for dysuria and hematuria.   Musculoskeletal:  Negative for arthralgias and back pain.   Skin:  Negative for color change and rash.   Neurological:  Negative for seizures and syncope.   All other systems reviewed and are negative.        Objective:     Physical Exam  Vitals and nursing note reviewed.   Constitutional:       Appearance: Normal appearance. She is well-developed.   HENT:      Head: Normocephalic and atraumatic.   Chest:   Breasts:     Right: No inverted nipple, mass, nipple discharge or skin change.      Left: No inverted nipple, mass, nipple discharge or skin change.   Abdominal:      Palpations: Abdomen is soft.   Genitourinary:     Exam position: Supine.      Labia:         Right: No rash, tenderness or lesion.         Left: No rash, tenderness or lesion.       Vagina: Normal.      Cervix: No cervical motion tenderness, discharge or friability.      Adnexa:          Right: No mass, tenderness or fullness.          Left: No mass, tenderness or fullness.     Musculoskeletal:      Cervical back: Normal range of motion.   Lymphadenopathy:      Lower Body: No right inguinal adenopathy. No left inguinal adenopathy.   Neurological:      Mental Status: She is alert.

## 2024-08-15 ENCOUNTER — OFFICE VISIT (OUTPATIENT)
Dept: INTERNAL MEDICINE CLINIC | Age: 19
End: 2024-08-15
Payer: COMMERCIAL

## 2024-08-15 ENCOUNTER — APPOINTMENT (OUTPATIENT)
Dept: LAB | Age: 19
End: 2024-08-15
Payer: COMMERCIAL

## 2024-08-15 VITALS
BODY MASS INDEX: 28.72 KG/M2 | HEART RATE: 82 BPM | SYSTOLIC BLOOD PRESSURE: 110 MMHG | HEIGHT: 67 IN | WEIGHT: 183 LBS | TEMPERATURE: 98 F | OXYGEN SATURATION: 98 % | DIASTOLIC BLOOD PRESSURE: 70 MMHG

## 2024-08-15 DIAGNOSIS — D56.1 BETA-THALASSEMIA (HCC): ICD-10-CM

## 2024-08-15 DIAGNOSIS — D70.9 NEUTROPENIA, UNSPECIFIED TYPE (HCC): ICD-10-CM

## 2024-08-15 DIAGNOSIS — E53.8 VITAMIN B 12 DEFICIENCY: ICD-10-CM

## 2024-08-15 DIAGNOSIS — D50.8 OTHER IRON DEFICIENCY ANEMIA: Primary | ICD-10-CM

## 2024-08-15 LAB
BASOPHILS # BLD AUTO: 0.02 THOUSANDS/ÂΜL (ref 0–0.1)
BASOPHILS NFR BLD AUTO: 1 % (ref 0–1)
EOSINOPHIL # BLD AUTO: 0.08 THOUSAND/ÂΜL (ref 0–0.61)
EOSINOPHIL NFR BLD AUTO: 3 % (ref 0–6)
ERYTHROCYTE [DISTWIDTH] IN BLOOD BY AUTOMATED COUNT: 16.9 % (ref 11.6–15.1)
HCT VFR BLD AUTO: 38.1 % (ref 34.8–46.1)
HGB BLD-MCNC: 11.3 G/DL (ref 11.5–15.4)
IMM GRANULOCYTES # BLD AUTO: 0.01 THOUSAND/UL (ref 0–0.2)
IMM GRANULOCYTES NFR BLD AUTO: 0 % (ref 0–2)
INDURATION: 0 MM
LYMPHOCYTES # BLD AUTO: 1.64 THOUSANDS/ÂΜL (ref 0.6–4.47)
LYMPHOCYTES NFR BLD AUTO: 64 % (ref 14–44)
MCH RBC QN AUTO: 19.7 PG (ref 26.8–34.3)
MCHC RBC AUTO-ENTMCNC: 29.7 G/DL (ref 31.4–37.4)
MCV RBC AUTO: 67 FL (ref 82–98)
MONOCYTES # BLD AUTO: 0.45 THOUSAND/ÂΜL (ref 0.17–1.22)
MONOCYTES NFR BLD AUTO: 18 % (ref 4–12)
NEUTROPHILS # BLD AUTO: 0.36 THOUSANDS/ÂΜL (ref 1.85–7.62)
NEUTS SEG NFR BLD AUTO: 14 % (ref 43–75)
NRBC BLD AUTO-RTO: 0 /100 WBCS
PLATELET # BLD AUTO: 380 THOUSANDS/UL (ref 149–390)
PMV BLD AUTO: 11.8 FL (ref 8.9–12.7)
RBC # BLD AUTO: 5.73 MILLION/UL (ref 3.81–5.12)
TB SKIN TEST: NEGATIVE
TSH SERPL DL<=0.05 MIU/L-ACNC: 0.53 UIU/ML (ref 0.45–4.5)
VIT B12 SERPL-MCNC: 397 PG/ML (ref 180–914)
WBC # BLD AUTO: 2.56 THOUSAND/UL (ref 4.31–10.16)

## 2024-08-15 PROCEDURE — 36415 COLL VENOUS BLD VENIPUNCTURE: CPT

## 2024-08-15 PROCEDURE — 99214 OFFICE O/P EST MOD 30 MIN: CPT | Performed by: INTERNAL MEDICINE

## 2024-08-15 PROCEDURE — 84443 ASSAY THYROID STIM HORMONE: CPT

## 2024-08-15 PROCEDURE — 82607 VITAMIN B-12: CPT

## 2024-08-15 PROCEDURE — 85025 COMPLETE CBC W/AUTO DIFF WBC: CPT

## 2024-08-15 NOTE — PROGRESS NOTES
Assessment/Plan:     Diagnoses and all orders for this visit:    Other iron deficiency anemia    Neutropenia, unspecified type (HCC)    Beta-thalassemia (HCC)  -     TSH, 3rd generation; Future    Vitamin B 12 deficiency  -     Vitamin B12; Future  -     TSH, 3rd generation; Future           M*Modal software was used to dictate this note.  It may contain errors with dictating incorrect words or incorrect spelling. Please contact the provider directly with any questions.    Subjective:   Chief Complaint   Patient presents with    Follow-up     6 month follow up PPD READ     TB Test     NEGATIVE         Patient ID: Alejandra Velasquez is a 19 y.o. female.    HPI  Pleasant 19 years young lady who is here today for the regular follow-up medical concerns include beta thalassemia she is a stable some association of leukopenia with that she does not have any symptoms she is doing well globin and hematocrit is slightly lower than the last time she is taking iron supplement we will continue to follow in 6-month and check the blood counts again she does not have any blood loss except for her menstrual period.    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    Review of Systems   Constitutional:  Negative for chills and fatigue.   HENT:  Negative for congestion, ear pain, hearing loss, postnasal drip, sinus pressure, sore throat and voice change.     Eyes:  Negative for pain, discharge and visual disturbance.   Respiratory:  Negative for cough, chest tightness and shortness of breath.    Cardiovascular:  Negative for chest pain, palpitations and leg swelling.   Gastrointestinal:  Negative for abdominal pain, blood in stool, diarrhea, nausea and rectal pain.   Genitourinary:  Negative for difficulty urinating, dysuria and urgency.   Musculoskeletal:  Negative for arthralgias and joint swelling.   Skin:  Negative for rash.   Allergic/Immunologic: Negative for environmental allergies and food allergies.   Neurological:  Negative for dizziness, tremors, weakness, numbness and headaches.   Hematological:  Negative for adenopathy.   Psychiatric/Behavioral:  Negative for behavioral problems and hallucinations.          Past Medical History:   Diagnosis Date    Migraine          Current Outpatient Medications:     Aspirin-Acetaminophen-Caffeine (EXCEDRIN MIGRAINE PO), Take 1 tablet by mouth as needed (headaches), Disp: , Rfl:     Cholecalciferol (VITAMIN D3 PO), Take 5,000 Units by mouth daily, Disp: , Rfl:     ferrous sulfate 325 (65 Fe) mg tablet, TAKE 1 TABLET (325 MG TOTAL) BY MOUTH DAILY WITH BREAKFAST DO NOT START BEFORE JULY 22, 2023. (Patient taking differently: Take 325 mg by mouth Every other day), Disp: 90 tablet, Rfl: 1    norethindrone-ethinyl estradiol (JUNEL FE 1/20) 1-20 MG-MCG per tablet, Take 1 tablet by mouth daily, Disp: 90 tablet, Rfl: 4    vitamin B-12 (CYANOCOBALAMIN) 500 MCG TABS, Take 2 tablets (1,000 mcg total) by mouth daily, Disp: 60 tablet, Rfl: 0    Current Facility-Administered Medications:     cyanocobalamin injection 1,000 mcg, 1,000 mcg, Intramuscular, Q30 Days, Greg Salcedo MD, 1,000 mcg at 08/24/23 0952    No Known Allergies    Social History   Past Surgical History:   Procedure Laterality Date    HERNIA REPAIR  2006    REDUCTION MAMMAPLASTY  07/07/2020    WISDOM TOOTH EXTRACTION  12/2023     Family History   Problem Relation Age  "of Onset    Thyroid cancer Mother     No Known Problems Sister     No Known Problems Sister     No Known Problems Sister     Hypertension Maternal Grandfather        Objective:  /70 (BP Location: Left arm, Patient Position: Sitting, Cuff Size: Large)   Pulse 82   Temp 98 °F (36.7 °C) (Temporal)   Ht 5' 7\" (1.702 m)   Wt 83 kg (183 lb)   LMP 07/24/2024 (Exact Date)   SpO2 98%   BMI 28.66 kg/m²        Physical Exam  Constitutional:       Appearance: She is well-developed.   HENT:      Right Ear: Tympanic membrane and external ear normal.      Left Ear: Tympanic membrane normal.   Eyes:      Conjunctiva/sclera: Conjunctivae normal.      Pupils: Pupils are equal, round, and reactive to light.   Neck:      Thyroid: No thyromegaly.      Vascular: No JVD.   Cardiovascular:      Rate and Rhythm: Normal rate and regular rhythm.      Heart sounds: Normal heart sounds.   Pulmonary:      Breath sounds: Normal breath sounds.   Abdominal:      General: Bowel sounds are normal.      Palpations: Abdomen is soft.   Musculoskeletal:         General: Normal range of motion.      Cervical back: Normal range of motion.   Lymphadenopathy:      Cervical: No cervical adenopathy.   Skin:     General: Skin is dry.   Neurological:      General: No focal deficit present.      Mental Status: She is alert and oriented to person, place, and time. Mental status is at baseline.      Deep Tendon Reflexes: Reflexes are normal and symmetric.   Psychiatric:         Mood and Affect: Mood normal.         Behavior: Behavior normal.           "

## 2024-11-11 ENCOUNTER — OFFICE VISIT (OUTPATIENT)
Age: 19
End: 2024-11-11
Payer: COMMERCIAL

## 2024-11-11 VITALS
HEIGHT: 67 IN | DIASTOLIC BLOOD PRESSURE: 68 MMHG | RESPIRATION RATE: 18 BRPM | TEMPERATURE: 98.5 F | OXYGEN SATURATION: 100 % | WEIGHT: 189.82 LBS | SYSTOLIC BLOOD PRESSURE: 124 MMHG | HEART RATE: 99 BPM | BODY MASS INDEX: 29.79 KG/M2

## 2024-11-11 DIAGNOSIS — J06.9 VIRAL URI: Primary | ICD-10-CM

## 2024-11-11 PROCEDURE — G0382 LEV 3 HOSP TYPE B ED VISIT: HCPCS | Performed by: EMERGENCY MEDICINE

## 2024-11-11 PROCEDURE — S9083 URGENT CARE CENTER GLOBAL: HCPCS | Performed by: EMERGENCY MEDICINE

## 2024-11-11 RX ORDER — PREDNISONE 10 MG/1
TABLET ORAL
Qty: 21 TABLET | Refills: 0 | Status: SHIPPED | OUTPATIENT
Start: 2024-11-11 | End: 2024-11-13

## 2024-11-11 NOTE — PROGRESS NOTES
[facility]        NAME: Alejandra Velasquez is a 19 y.o. female  : 2005    MRN: [id number]  DATE: 2024  TIME: 6:43 PM    Assessment and Plan   Viral URI [J06.9]  1. Viral URI  predniSONE 10 mg tablet            Patient Instructions     Patient Instructions   You have been diagnosed with a Viral Upper Respiratory infection and your symptoms should resolve over the next 7 to 10 days with the treatments recommended today.  If they do not, it is possible that you have developed a bacterial infection and you should return. If you were to take an antibiotic while you are still in the viral stage, you will not get better any faster, but could kill off many of the good germs in your body as well as make the germs in you resistant to the antibiotic.  Take an expectorant - guaifenesin should be the only ingredient - during the day, and a cough suppressant (ex. Robitussin DM or Tessalon) if needed at night only.   Take Zinc 25 mg every 12 hours for the next week (the dose is important so do not just take a multivitamin with zinc or an over-the-counter cold med with zinc such as Airborne or Zicam, as that will not give you the sufficient dose).    You should also take Vitamin D3 5000 i.u.s per day for the next 1 week, and Vitamin-C 1 g twice daily (and again dosages are important, do not think you are getting enough vitamin C just by drinking extra orange juice).  You may use Flonase as discussed.  You may also take a decongestant like Sudafed, unless you have hypertension or cardiac disease. Avoid nasal sprays like Afrin or other vasoconstrictors.  If you are diabetic you should adhere strictly to your diabetic diet and monitor blood sugar closely while on prednisone and you should discontinue the prednisone if blood sugar becomes significantly elevated.  Avoid nonsteroidal anti-inflammatories like Advil or Aleve while on prednisone.   Although bothersome, mucous is not necessarily a bad thing. Production  of mucous is the body's way of trying to capture and flush irritants from mucosal surfaces. Yellow or green mucous does not necessarily mean you have a bacterial infection. Mucous will become more discolored over time, especially first thing in the morning, as your body's immune system floods the mucosal surfaces with white bloods cells to try and help fight infection. This white blood cell debride can also cause mucous to be discolored. Again, using nasal saline spray frequently may help soothe and keep mucous flowing out versus getting dried, thickened and / or stuck leading to more sinus pain and pressure.      If you have a cough, please realize that a cough is not necessarily a bad thing. It is a part of your body's protection mechanism to help keep your airways clear. Phlegm may be more discolored in the morning. Please note that discolored phlegm does not necessarily mean a bacterial infection.      Upper Respiratory Infection   AMBULATORY CARE:   An upper respiratory infection  is also called a cold. Your nose, throat, ears, and sinuses may be affected. You are more likely to get a cold in the winter. Your risk of getting a cold may be increased if you smoke cigarettes or have allergies, such as hay fever.  What causes a cold?  A cold is caused by a virus. Many viruses can cause a cold, and each is contagious. This means the virus can be easily spread to another person when the sick person coughs or sneezes. The virus can also be spread if you touch an object the virus is on and then touch your eyes, mouth, or nose.  Cold symptoms  are usually worst for the first 3 to 5 days. You may have any of the following:  Runny or stuffy nose    Sneezing and coughing    Sore throat or hoarseness    Red, watery, and sore eyes    Fatigue (you feel more tired than usual)    Chills and fever    Headache, body aches, or sore muscles    Call your local emergency number (911 in the [place]) if:   You have chest pain or trouble  breathing.      Seek care immediately if:   You have a fever over 102ºF (39ºC).      Call your doctor if:   You have a low fever.    Your sore throat gets worse or you see white or yellow spots in your throat.    Your symptoms get worse after 3 to 5 days or are not better in 14 days.    You have a rash anywhere on your skin.    You have large, tender lumps in your neck.    You have thick, green, or yellow drainage from your nose.    You cough up thick yellow, green, or bloody mucus.    You have a bad earache.    You have questions or concerns about your condition or care.    Treatment:  Colds are caused by viruses and do not get better with antibiotics. Most people get better in 7 to 14 days. You may continue to cough for 2 to 3 weeks. The following may help decrease your symptoms:  Decongestants  help reduce nasal congestion and help you breathe more easily. If you take decongestant pills, they may make you feel restless or not able to sleep. Do not use decongestant sprays for more than a few days.    Cough suppressants  help reduce coughing. Ask your healthcare provider which type of cough medicine is best for you.     NSAIDs , such as ibuprofen, help decrease swelling, pain, and fever. NSAIDs can cause stomach bleeding or kidney problems in certain people. If you take blood thinner medicine, always ask your healthcare provider if NSAIDs are safe for you. Always read the medicine label and follow directions.    Acetaminophen  decreases pain and fever. It is available without a doctor's order. Ask how much to take and how often to take it. Follow directions. Read the labels of all other medicines you are using to see if they also contain acetaminophen, or ask your doctor or pharmacist. Acetaminophen can cause liver damage if not taken correctly. Do not use more than 4 grams (4,000 milligrams) total of acetaminophen in one day.    Manage a cold:   Rest as much as possible.  Slowly start to do more each day.    Drink  more liquids as directed.  Liquids will help thin and loosen mucus so you can cough it up. Liquids will also help prevent dehydration. Liquids that help prevent dehydration include water, fruit juice, and broth. Do not drink liquids that contain caffeine. Caffeine can increase your risk for dehydration. Ask your healthcare provider how much liquid to drink each day.    Soothe a sore throat.  Gargle with warm salt water. Make salt water by dissolving ¼ teaspoon salt in 1 cup warm water. You may also suck on hard candy or throat lozenges. You may use a sore throat spray.    Use a humidifier or vaporizer.  Use a cool mist humidifier or a vaporizer to increase air moisture in your home. This may make it easier for you to breathe and help decrease your cough.    Use saline nasal drops as directed.  These help relieve congestion.    Apply petroleum-based jelly around the outside of your nostrils.  This can decrease irritation from blowing your nose.    Do not smoke.  Nicotine and other chemicals in cigarettes and cigars can make your symptoms worse. They can also cause infections such as bronchitis or pneumonia. Ask your healthcare provider for information if you currently smoke and need help to quit. E-cigarettes or smokeless tobacco still contain nicotine. Talk to your healthcare provider before you use these products.    Prevent a cold:   Wash your hands often.  Use soap and water every time you wash your hands. Rub your soapy hands together, lacing your fingers. Use the fingers of one hand to scrub under the nails of the other hand. Wash for at least 20 seconds. Rinse with warm, running water for several seconds. Then dry your hands. Use germ-killing gel if soap and water are not available. Do not touch your eyes or mouth without washing your hands first.     Cover a sneeze or cough.  Use a tissue that covers your mouth and nose. Put the used tissue in the trash right away. Use the bend of your arm if a tissue is not  available. Wash your hands well with soap and water or use a hand . Do not stand close to anyone who is sneezing or coughing.    Try to stay away from others while you are sick.  This is especially important during the first 2 to 3 days when the virus is more easily spread. Wait until a fever, cough, or other symptoms are gone before you return to work or other regular activities.    Do not share items while you are sick.  This includes food, drinks, eating utensils, and dishes.    Follow up with your doctor as directed:  Write down your questions so you remember to ask them during your visits.  © Copyright [employer] 2022 Information is for End User's use only and may not be sold, redistributed or otherwise used for commercial purposes. All illustrations and images included in CareNotes® are the copyrighted property of [employer], [employer] or [employer]  The above information is an  only. It is not intended as medical advice for individual conditions or treatments. Talk to your doctor, nurse or pharmacist before following any medical regimen to see if it is safe and effective for you.          Follow up with PCP in 3-5 days.  Proceed to  ER if symptoms worsen.    Chief Complaint     Chief Complaint   Patient presents with    Cold Like Symptoms     Since Wednesday sinus pressure, sore throat, and cough started yesterday. Taking sudafed.          History of Present Illness       Patient complains of sore throat, cough and congestion for the past 5 days.        Review of Systems   Review of Systems   Constitutional:  Negative for appetite change, chills, fatigue and fever.   HENT:  Positive for congestion, rhinorrhea, sinus pressure and sore throat. Negative for trouble swallowing and voice change.    Respiratory:  Positive for cough. Negative for chest tightness, shortness of breath and wheezing.    Cardiovascular:  Negative for chest pain.   Gastrointestinal:  Negative for nausea.    Musculoskeletal:  Negative for myalgias.         Current Medications       Current Outpatient Medications:     Cholecalciferol (VITAMIN D3 PO), Take 5,000 Units by mouth daily, Disp: , Rfl:     ferrous sulfate 325 (65 Fe) mg tablet, TAKE 1 TABLET (325 MG TOTAL) BY MOUTH DAILY WITH BREAKFAST DO NOT START BEFORE JULY 22, 2023. (Patient taking differently: Take 325 mg by mouth Every other day), Disp: 90 tablet, Rfl: 1    norethindrone-ethinyl estradiol (JUNEL FE 1/20) 1-20 MG-MCG per tablet, Take 1 tablet by mouth daily, Disp: 90 tablet, Rfl: 4    predniSONE 10 mg tablet, Take once daily all day's pills on this schedule 6- 5- 4- 3- 2- 1, Disp: 21 tablet, Rfl: 0    vitamin B-12 (CYANOCOBALAMIN) 500 MCG TABS, Take 2 tablets (1,000 mcg total) by mouth daily, Disp: 60 tablet, Rfl: 0    Aspirin-Acetaminophen-Caffeine (EXCEDRIN MIGRAINE PO), Take 1 tablet by mouth as needed (headaches) (Patient not taking: Reported on 11/11/2024), Disp: , Rfl:     Current Facility-Administered Medications:     cyanocobalamin injection 1,000 mcg, 1,000 mcg, Intramuscular, Q30 Days, Greg Salcedo MD, 1,000 mcg at 08/24/23 0952    Current Allergies     Allergies as of 11/11/2024    (No Known Allergies)            The following portions of the patient's history were reviewed and updated as appropriate: allergies, current medications, past family history, past medical history, past social history, past surgical history and problem list.     Past Medical History:   Diagnosis Date    Migraine        Past Surgical History:   Procedure Laterality Date    HERNIA REPAIR  2006    REDUCTION MAMMAPLASTY  07/07/2020    WISDOM TOOTH EXTRACTION  12/2023       Family History   Problem Relation Age of Onset    Thyroid cancer Mother     No Known Problems Father     No Known Problems Sister     No Known Problems Sister     No Known Problems Sister     Hypertension Maternal Grandfather          Medications have been verified.        Objective   /68    "Pulse 99   Temp 98.5 °F (36.9 °C)   Resp 18   Ht 5' 7\" (1.702 m)   Wt 86.1 kg (189 lb 13.1 oz)   LMP 10/31/2024 (Approximate)   SpO2 100%   BMI 29.73 kg/m²        Physical Exam     Physical Exam  Vitals and nursing note reviewed.   Constitutional:       General: She is not in acute distress.     Appearance: She is well-developed. She is not ill-appearing or toxic-appearing.   HENT:      Head: Normocephalic and atraumatic.      Right Ear: External ear normal.      Left Ear: External ear normal.      Nose: Mucosal edema and congestion present.      Mouth/Throat:      Pharynx: Posterior oropharyngeal erythema present. No oropharyngeal exudate.      Tonsils: No tonsillar abscesses.   Cardiovascular:      Rate and Rhythm: Normal rate and regular rhythm.   Pulmonary:      Effort: Pulmonary effort is normal. No respiratory distress.      Breath sounds: No wheezing, rhonchi or rales.   Musculoskeletal:      Cervical back: Neck supple.   Skin:     General: Skin is warm and dry.      Coloration: Skin is not pale.      Findings: No rash.   Neurological:      Mental Status: She is alert and oriented to person, place, and time.   Psychiatric:         Mood and Affect: Mood normal.         Behavior: Behavior normal.         Thought Content: Thought content normal.         Judgment: Judgment normal.                   "

## 2024-11-11 NOTE — PATIENT INSTRUCTIONS
You have been diagnosed with a Viral Upper Respiratory infection and your symptoms should resolve over the next 7 to 10 days with the treatments recommended today.  If they do not, it is possible that you have developed a bacterial infection and you should return. If you were to take an antibiotic while you are still in the viral stage, you will not get better any faster, but could kill off many of the good germs in your body as well as make the germs in you resistant to the antibiotic.  Take an expectorant - guaifenesin should be the only ingredient - during the day, and a cough suppressant (ex. Robitussin DM or Tessalon) if needed at night only.   Take Zinc 25 mg every 12 hours for the next week (the dose is important so do not just take a multivitamin with zinc or an over-the-counter cold med with zinc such as Airborne or Zicam, as that will not give you the sufficient dose).    You should also take Vitamin D3 5000 i.u.s per day for the next 1 week, and Vitamin-C 1 g twice daily (and again dosages are important, do not think you are getting enough vitamin C just by drinking extra orange juice).  You may use Flonase as discussed.  You may also take a decongestant like Sudafed, unless you have hypertension or cardiac disease. Avoid nasal sprays like Afrin or other vasoconstrictors.  If you are diabetic you should adhere strictly to your diabetic diet and monitor blood sugar closely while on prednisone and you should discontinue the prednisone if blood sugar becomes significantly elevated.  Avoid nonsteroidal anti-inflammatories like Advil or Aleve while on prednisone.   Although bothersome, mucous is not necessarily a bad thing. Production of mucous is the body's way of trying to capture and flush irritants from mucosal surfaces. Yellow or green mucous does not necessarily mean you have a bacterial infection. Mucous will become more discolored over time, especially first thing in the morning, as your body's immune  system floods the mucosal surfaces with white bloods cells to try and help fight infection. This white blood cell debride can also cause mucous to be discolored. Again, using nasal saline spray frequently may help soothe and keep mucous flowing out versus getting dried, thickened and / or stuck leading to more sinus pain and pressure.      If you have a cough, please realize that a cough is not necessarily a bad thing. It is a part of your body's protection mechanism to help keep your airways clear. Phlegm may be more discolored in the morning. Please note that discolored phlegm does not necessarily mean a bacterial infection.      Upper Respiratory Infection   AMBULATORY CARE:   An upper respiratory infection  is also called a cold. Your nose, throat, ears, and sinuses may be affected. You are more likely to get a cold in the winter. Your risk of getting a cold may be increased if you smoke cigarettes or have allergies, such as hay fever.  What causes a cold?  A cold is caused by a virus. Many viruses can cause a cold, and each is contagious. This means the virus can be easily spread to another person when the sick person coughs or sneezes. The virus can also be spread if you touch an object the virus is on and then touch your eyes, mouth, or nose.  Cold symptoms  are usually worst for the first 3 to 5 days. You may have any of the following:  Runny or stuffy nose    Sneezing and coughing    Sore throat or hoarseness    Red, watery, and sore eyes    Fatigue (you feel more tired than usual)    Chills and fever    Headache, body aches, or sore muscles    Call your local emergency number (911 in the [place]) if:   You have chest pain or trouble breathing.      Seek care immediately if:   You have a fever over 102ºF (39ºC).      Call your doctor if:   You have a low fever.    Your sore throat gets worse or you see white or yellow spots in your throat.    Your symptoms get worse after 3 to 5 days or are not better in 14  days.    You have a rash anywhere on your skin.    You have large, tender lumps in your neck.    You have thick, green, or yellow drainage from your nose.    You cough up thick yellow, green, or bloody mucus.    You have a bad earache.    You have questions or concerns about your condition or care.    Treatment:  Colds are caused by viruses and do not get better with antibiotics. Most people get better in 7 to 14 days. You may continue to cough for 2 to 3 weeks. The following may help decrease your symptoms:  Decongestants  help reduce nasal congestion and help you breathe more easily. If you take decongestant pills, they may make you feel restless or not able to sleep. Do not use decongestant sprays for more than a few days.    Cough suppressants  help reduce coughing. Ask your healthcare provider which type of cough medicine is best for you.     NSAIDs , such as ibuprofen, help decrease swelling, pain, and fever. NSAIDs can cause stomach bleeding or kidney problems in certain people. If you take blood thinner medicine, always ask your healthcare provider if NSAIDs are safe for you. Always read the medicine label and follow directions.    Acetaminophen  decreases pain and fever. It is available without a doctor's order. Ask how much to take and how often to take it. Follow directions. Read the labels of all other medicines you are using to see if they also contain acetaminophen, or ask your doctor or pharmacist. Acetaminophen can cause liver damage if not taken correctly. Do not use more than 4 grams (4,000 milligrams) total of acetaminophen in one day.    Manage a cold:   Rest as much as possible.  Slowly start to do more each day.    Drink more liquids as directed.  Liquids will help thin and loosen mucus so you can cough it up. Liquids will also help prevent dehydration. Liquids that help prevent dehydration include water, fruit juice, and broth. Do not drink liquids that contain caffeine. Caffeine can increase  your risk for dehydration. Ask your healthcare provider how much liquid to drink each day.    Soothe a sore throat.  Gargle with warm salt water. Make salt water by dissolving ¼ teaspoon salt in 1 cup warm water. You may also suck on hard candy or throat lozenges. You may use a sore throat spray.    Use a humidifier or vaporizer.  Use a cool mist humidifier or a vaporizer to increase air moisture in your home. This may make it easier for you to breathe and help decrease your cough.    Use saline nasal drops as directed.  These help relieve congestion.    Apply petroleum-based jelly around the outside of your nostrils.  This can decrease irritation from blowing your nose.    Do not smoke.  Nicotine and other chemicals in cigarettes and cigars can make your symptoms worse. They can also cause infections such as bronchitis or pneumonia. Ask your healthcare provider for information if you currently smoke and need help to quit. E-cigarettes or smokeless tobacco still contain nicotine. Talk to your healthcare provider before you use these products.    Prevent a cold:   Wash your hands often.  Use soap and water every time you wash your hands. Rub your soapy hands together, lacing your fingers. Use the fingers of one hand to scrub under the nails of the other hand. Wash for at least 20 seconds. Rinse with warm, running water for several seconds. Then dry your hands. Use germ-killing gel if soap and water are not available. Do not touch your eyes or mouth without washing your hands first.     Cover a sneeze or cough.  Use a tissue that covers your mouth and nose. Put the used tissue in the trash right away. Use the bend of your arm if a tissue is not available. Wash your hands well with soap and water or use a hand . Do not stand close to anyone who is sneezing or coughing.    Try to stay away from others while you are sick.  This is especially important during the first 2 to 3 days when the virus is more easily  spread. Wait until a fever, cough, or other symptoms are gone before you return to work or other regular activities.    Do not share items while you are sick.  This includes food, drinks, eating utensils, and dishes.    Follow up with your doctor as directed:  Write down your questions so you remember to ask them during your visits.  © Copyright [employer] 2022 Information is for End User's use only and may not be sold, redistributed or otherwise used for commercial purposes. All illustrations and images included in CareNotes® are the copyrighted property of [employer]. or [employer]  The above information is an  only. It is not intended as medical advice for individual conditions or treatments. Talk to your doctor, nurse or pharmacist before following any medical regimen to see if it is safe and effective for you.

## 2024-11-13 ENCOUNTER — TELEMEDICINE (OUTPATIENT)
Dept: INTERNAL MEDICINE CLINIC | Age: 19
End: 2024-11-13
Payer: COMMERCIAL

## 2024-11-13 DIAGNOSIS — J01.00 ACUTE NON-RECURRENT MAXILLARY SINUSITIS: Primary | ICD-10-CM

## 2024-11-13 PROCEDURE — 99213 OFFICE O/P EST LOW 20 MIN: CPT | Performed by: PHYSICIAN ASSISTANT

## 2024-11-13 RX ORDER — AZITHROMYCIN 250 MG/1
TABLET, FILM COATED ORAL
Qty: 6 TABLET | Refills: 0 | Status: SHIPPED | OUTPATIENT
Start: 2024-11-13 | End: 2024-11-18

## 2024-11-13 NOTE — PROGRESS NOTES
Virtual Regular Visit  Name: Alejandra Velasquez      : 2005      MRN: [id number]  Encounter Provider: Sabrina Colin PA-C  Encounter Date: 2024   Encounter department: [facility]    Verification of patient location:    Patient is located at Home in the following state in which I hold an active license [place]    :  Assessment & Plan  Acute non-recurrent maxillary sinusitis  May stay off prednisone  +zyrtec qhs x 5 days  Take daily probiotic  Advised back up method while on abx as this can make ocp less effective  F/u in 5-7 days if sx not improved or if any worsening cough, sob, sx   Orders:    azithromycin (Zithromax) 250 mg tablet; Take 2 tablets (500 mg total) by mouth daily for 1 day, THEN 1 tablet (250 mg total) daily for 4 days.        Encounter provider Sabrina Colin PA-C    The patient was identified by name and date of birth. Alejandra Velasquez was informed that this is a telemedicine visit and that the visit is being conducted through the Epic Embedded platform. She agrees to proceed..  My office door was closed. No one else was in the room.  She acknowledged consent and understanding of privacy and security of the video platform. The patient has agreed to participate and understands they can discontinue the visit at any time.    Patient is aware this is a billable service.     History of Present Illness     20 y/o female with c/o sinus pressure and nasal congestion x 7-8 days, pt states she has pressure over her maxillary sinuses - worse in am. Pt reports nasal drainage - yellow and blood tinged. Pt taking sudafed prn - helps for a few hours. Pt reports hx of sinus problems and infections and is concerned that is what is going on. Pt reports cough, productive and slight sob with exertion. Pt denies fever, chills, night sweats.     Pt went to urgent care yesterday and was prescribed prednisone but is leary to take this. She is concerned with side effects with  medication     History of Present Illness   Objective     [facility] 10/31/2024 (Approximate)   Physical Exam  Vitals reviewed.   Constitutional:       General: She is not in acute distress.  HENT:      Head: Normocephalic and atraumatic.      Nose: Congestion present.   Pulmonary:      Effort: Pulmonary effort is normal. No respiratory distress.   Neurological:      General: No focal deficit present.      Mental Status: She is alert.   Psychiatric:         Mood and Affect: Mood normal.         Visit Time  Total Visit Duration: 11 min

## 2024-11-26 ENCOUNTER — TELEPHONE (OUTPATIENT)
Age: 19
End: 2024-11-26

## 2024-11-26 NOTE — TELEPHONE ENCOUNTER
Patient needs to have a TB test done tomorrow morning if possible and read on Friday. No paperwork to be completed. Please have provider place order and call patient to school.

## 2024-11-27 ENCOUNTER — CLINICAL SUPPORT (OUTPATIENT)
Dept: INTERNAL MEDICINE CLINIC | Age: 19
End: 2024-11-27
Payer: COMMERCIAL

## 2024-11-27 DIAGNOSIS — Z11.1 SCREENING FOR TUBERCULOSIS: Primary | ICD-10-CM

## 2024-11-27 DIAGNOSIS — Z11.1 PPD SCREENING TEST: ICD-10-CM

## 2024-11-27 PROCEDURE — 86580 TB INTRADERMAL TEST: CPT

## 2024-11-29 ENCOUNTER — CLINICAL SUPPORT (OUTPATIENT)
Dept: INTERNAL MEDICINE CLINIC | Age: 19
End: 2024-11-29

## 2024-11-29 DIAGNOSIS — Z11.1 ENCOUNTER FOR PPD SKIN TEST READING: Primary | ICD-10-CM

## 2024-11-29 LAB
INDURATION: 0 MM
TB SKIN TEST: NEGATIVE

## 2024-11-29 PROCEDURE — NURSE

## 2024-12-26 ENCOUNTER — OFFICE VISIT (OUTPATIENT)
Age: 19
End: 2024-12-26
Payer: COMMERCIAL

## 2024-12-26 VITALS
BODY MASS INDEX: 29.22 KG/M2 | TEMPERATURE: 99.1 F | DIASTOLIC BLOOD PRESSURE: 86 MMHG | HEART RATE: 84 BPM | HEIGHT: 68 IN | WEIGHT: 192.8 LBS | SYSTOLIC BLOOD PRESSURE: 130 MMHG | OXYGEN SATURATION: 99 %

## 2024-12-26 DIAGNOSIS — Z20.828 EXPOSURE TO INFLUENZA: ICD-10-CM

## 2024-12-26 DIAGNOSIS — J06.9 UPPER RESPIRATORY TRACT INFECTION, UNSPECIFIED TYPE: Primary | ICD-10-CM

## 2024-12-26 LAB
SARS-COV-2 AG UPPER RESP QL IA: NEGATIVE
SL AMB POCT RAPID FLU A: NEGATIVE
SL AMB POCT RAPID FLU B: NEGATIVE
VALID CONTROL: NORMAL

## 2024-12-26 PROCEDURE — 99213 OFFICE O/P EST LOW 20 MIN: CPT | Performed by: FAMILY MEDICINE

## 2024-12-26 PROCEDURE — 87811 SARS-COV-2 COVID19 W/OPTIC: CPT | Performed by: FAMILY MEDICINE

## 2024-12-26 PROCEDURE — 87804 INFLUENZA ASSAY W/OPTIC: CPT | Performed by: FAMILY MEDICINE

## 2024-12-26 RX ORDER — AMOXICILLIN 875 MG/1
875 TABLET, COATED ORAL 2 TIMES DAILY
Qty: 14 TABLET | Refills: 0 | Status: SHIPPED | OUTPATIENT
Start: 2024-12-26 | End: 2025-01-02

## 2024-12-26 NOTE — PROGRESS NOTES
Supportive care for now, okay to start antibiotics in 3 to 4 days if symptoms or not improving assessment/Plan:    1. Upper respiratory tract infection, unspecified type  -     POCT Rapid Covid Ag  -     POCT rapid flu A and B  -     amoxicillin (AMOXIL) 875 mg tablet; Take 1 tablet (875 mg total) by mouth 2 (two) times a day for 7 days  2. Exposure to influenza          There are no Patient Instructions on file for this visit.    Return for Next scheduled follow up.    Subjective:      Patient ID: Alejandra Velasquez is a 19 y.o. female.    Chief Complaint   Patient presents with    Follow-up     Patient started on Tuesday night with sore throat Wednesday congestion and head pressure blowing yellow mucous out of her nose  feeling sinus pressure. Patient sister had the flu last week. Has been taking last past 2 days       HPI    2-day history of upper respiratory symptoms with sore throat and nasal congestion with yellow mucus from her nose, no coughing no shortness of breath no wheezing no fever.  Has sinus pressure.  Flu exposure from her sister      The following portions of the patient's history were reviewed and updated as appropriate: allergies, current medications, past family history, past medical history, past social history, past surgical history and problem list.    Review of Systems        Constitutional:  Denies fever or chills   Eyes:  Denies double , blurry vision or eye pain  HENT:  Denies nasal congestion, sore throat or new hearing issues  Respiratory:  Denies cough or shortness of breath or wheezing  Cardiovascular:  Denies palpitations or chest pain  GI:  Denies abdominal pain, nausea, or vomiting, no loose stools, no reflux  Integument:  Denies rash , no open areas  Neurologic:  Denies headache or focal weakness, no dizziness  : no dysuria, or hematuria    Current Outpatient Medications   Medication Sig Dispense Refill    amoxicillin (AMOXIL) 875 mg tablet Take 1 tablet (875 mg total) by mouth 2  "(two) times a day for 7 days 14 tablet 0    Cholecalciferol (VITAMIN D3 PO) Take 5,000 Units by mouth daily      ferrous sulfate 325 (65 Fe) mg tablet TAKE 1 TABLET (325 MG TOTAL) BY MOUTH DAILY WITH BREAKFAST DO NOT START BEFORE JULY 22, 2023. (Patient taking differently: Take 325 mg by mouth every other day) 90 tablet 1    norethindrone-ethinyl estradiol (JUNEL FE 1/20) 1-20 MG-MCG per tablet Take 1 tablet by mouth daily 90 tablet 4    vitamin B-12 (CYANOCOBALAMIN) 500 MCG TABS Take 2 tablets (1,000 mcg total) by mouth daily 60 tablet 0     Current Facility-Administered Medications   Medication Dose Route Frequency Provider Last Rate Last Admin    cyanocobalamin injection 1,000 mcg  1,000 mcg Intramuscular Q30 Days Greg Salcedo MD   1,000 mcg at 08/24/23 0952       Objective:    /86 (BP Location: Left arm, Patient Position: Sitting, Cuff Size: Standard)   Pulse 84   Temp 99.1 °F (37.3 °C) (Temporal)   Ht 5' 8.11\" (1.73 m)   Wt 87.5 kg (192 lb 12.8 oz)   SpO2 99%   BMI 29.22 kg/m²        Physical Exam         Constitutional:  Well developed, well nourished, no acute distress, non-toxic appearance   Eyes:  PERRL, conjunctiva normal , non icteric sclera  HENT:  Atraumatic, oropharynx moist.  Mild postnasal drip no erythema in posterior pharynx.  No sinus tenderness to palpation frontal and maxillary sinuses.  Tympanic membranes clear and intact without any signs of infection.  Neck-  supple   Respiratory:  CTA b/l, normal breath sounds, no rales, no wheezing   Cardiovascular:  RRR, no murmurs, no LE edema b/l  GI:  Soft, nondistended, normal bowel sounds x 4, nontender, no organomegaly, no mass, no rebound, no guarding   Neurologic:  no focal deficits noted   Psychiatric:  Speech and behavior appropriate , AAO x 3  Lymph, no cervical lymphadenopathy  Grover Jose Carlos, DO  "

## 2025-01-07 ENCOUNTER — TELEPHONE (OUTPATIENT)
Dept: ADMINISTRATIVE | Facility: OTHER | Age: 20
End: 2025-01-07

## 2025-01-07 ENCOUNTER — APPOINTMENT (OUTPATIENT)
Dept: LAB | Age: 20
End: 2025-01-07
Payer: COMMERCIAL

## 2025-01-07 ENCOUNTER — OFFICE VISIT (OUTPATIENT)
Dept: INTERNAL MEDICINE CLINIC | Age: 20
End: 2025-01-07
Payer: COMMERCIAL

## 2025-01-07 VITALS
HEART RATE: 92 BPM | BODY MASS INDEX: 29.1 KG/M2 | HEIGHT: 68 IN | TEMPERATURE: 98.4 F | DIASTOLIC BLOOD PRESSURE: 78 MMHG | OXYGEN SATURATION: 98 % | SYSTOLIC BLOOD PRESSURE: 116 MMHG | WEIGHT: 192 LBS

## 2025-01-07 DIAGNOSIS — E53.8 VITAMIN B 12 DEFICIENCY: ICD-10-CM

## 2025-01-07 DIAGNOSIS — D70.9 NEUTROPENIA, UNSPECIFIED TYPE (HCC): Primary | ICD-10-CM

## 2025-01-07 DIAGNOSIS — D50.8 OTHER IRON DEFICIENCY ANEMIA: ICD-10-CM

## 2025-01-07 DIAGNOSIS — D70.9 NEUTROPENIA, UNSPECIFIED TYPE (HCC): ICD-10-CM

## 2025-01-07 DIAGNOSIS — D56.1 BETA-THALASSEMIA (HCC): ICD-10-CM

## 2025-01-07 LAB
BASOPHILS # BLD AUTO: 0.02 THOUSANDS/ΜL (ref 0–0.1)
BASOPHILS NFR BLD AUTO: 1 % (ref 0–1)
EOSINOPHIL # BLD AUTO: 0.11 THOUSAND/ΜL (ref 0–0.61)
EOSINOPHIL NFR BLD AUTO: 3 % (ref 0–6)
ERYTHROCYTE [DISTWIDTH] IN BLOOD BY AUTOMATED COUNT: 18 % (ref 11.6–15.1)
HCT VFR BLD AUTO: 38.4 % (ref 34.8–46.1)
HGB BLD-MCNC: 11.3 G/DL (ref 11.5–15.4)
IMM GRANULOCYTES # BLD AUTO: 0 THOUSAND/UL (ref 0–0.2)
IMM GRANULOCYTES NFR BLD AUTO: 0 % (ref 0–2)
LYMPHOCYTES # BLD AUTO: 2.04 THOUSANDS/ΜL (ref 0.6–4.47)
LYMPHOCYTES NFR BLD AUTO: 57 % (ref 14–44)
MCH RBC QN AUTO: 19.3 PG (ref 26.8–34.3)
MCHC RBC AUTO-ENTMCNC: 29.4 G/DL (ref 31.4–37.4)
MCV RBC AUTO: 66 FL (ref 82–98)
MONOCYTES # BLD AUTO: 0.45 THOUSAND/ΜL (ref 0.17–1.22)
MONOCYTES NFR BLD AUTO: 13 % (ref 4–12)
NEUTROPHILS # BLD AUTO: 0.91 THOUSANDS/ΜL (ref 1.85–7.62)
NEUTS SEG NFR BLD AUTO: 26 % (ref 43–75)
NRBC BLD AUTO-RTO: 0 /100 WBCS
PLATELET # BLD AUTO: 375 THOUSANDS/UL (ref 149–390)
PMV BLD AUTO: 11.4 FL (ref 8.9–12.7)
RBC # BLD AUTO: 5.85 MILLION/UL (ref 3.81–5.12)
VIT B12 SERPL-MCNC: 256 PG/ML (ref 180–914)
WBC # BLD AUTO: 3.53 THOUSAND/UL (ref 4.31–10.16)

## 2025-01-07 PROCEDURE — 99214 OFFICE O/P EST MOD 30 MIN: CPT | Performed by: INTERNAL MEDICINE

## 2025-01-07 PROCEDURE — 36415 COLL VENOUS BLD VENIPUNCTURE: CPT

## 2025-01-07 PROCEDURE — 82607 VITAMIN B-12: CPT

## 2025-01-07 PROCEDURE — 85025 COMPLETE CBC W/AUTO DIFF WBC: CPT

## 2025-01-07 NOTE — TELEPHONE ENCOUNTER
----- Message from Amy PAIGE sent at 1/7/2025 10:11 AM EST -----  Regarding: Care Gap Request  01/07/25 10:11 AM    Hello, our patient attached above has had Pap Smear (HPV) aka Cervical Cancer Screening completed/performed. Please assist in updating the patient chart by pulling the document from LAB Tab within Chart Review. The date of service is 8/12/24.     Thank you,  Amy Michel  [facility]

## 2025-01-07 NOTE — TELEPHONE ENCOUNTER
Upon review of the In Basket request we were able to locate, review, and update the patient chart as requested for Pap Smear (HPV) aka Cervical Cancer Screening.    Any additional questions or concerns should be emailed to the Practice Liaisons via the appropriate education email address, please do not reply via In Basket.    Thank you  RALPH LI MA   [facility]

## 2025-01-07 NOTE — PROGRESS NOTES
Name: Alejandra Velasquez      : 2005      MRN: [id number]  Encounter Provider: Greg Salcedo MD  Encounter Date: 2025   Encounter department: [facility]  :  Assessment & Plan  Neutropenia, unspecified type (HCC)    Orders:    CBC and differential; Future    TSH, 3rd generation; Future    Beta-thalassemia (HCC)    Orders:    CBC and differential; Future    Other iron deficiency anemia         Vitamin B 12 deficiency    Orders:    Vitamin B12; Future           History of Present Illness     This is a very pleasant 19 years young lady is here for the regular follow-up    Beta thalassemia anemia was stable also with that she will had a iron deficiency she is taking iron pill every other day will get the blood workup today and go from there if she need to take iron every other day or maybe once a week her, her menstrual period's are regular and with no excessive bleeding    Leukopenia asymptomatic will repeat the CBC and will follow-up no symptoms of any infections or increased risk of infection    B12 deficiency right now she is taking B12 tablets she used to take the B12 injection I will repeat the B12 level and go from there    Family history of hypothyroidism I will get the TSH I will follow her up in about 6 months unless any problem      Review of Systems   Constitutional:  Negative for chills and fatigue.   HENT:  Negative for congestion, ear pain, hearing loss, postnasal drip, sinus pressure, sore throat and voice change.    Eyes:  Negative for pain, discharge and visual disturbance.   Respiratory:  Negative for cough, chest tightness and shortness of breath.    Cardiovascular:  Negative for chest pain, palpitations and leg swelling.   Gastrointestinal:  Negative for abdominal pain, blood in stool, diarrhea, nausea and rectal pain.   Genitourinary:  Negative for difficulty urinating, dysuria and urgency.   Musculoskeletal:  Negative for arthralgias and joint swelling.  "  Skin:  Negative for rash.   Allergic/Immunologic: Negative for environmental allergies and food allergies.   Neurological:  Negative for dizziness, tremors, weakness, numbness and headaches.   Hematological:  Negative for adenopathy.   Psychiatric/Behavioral:  Negative for behavioral problems and hallucinations.        Objective   /78 (BP Location: Left arm, Patient Position: Sitting, Cuff Size: Standard)   Pulse 92   Temp 98.4 °F (36.9 °C) (Temporal)   Ht 5' 8.11\" (1.73 m)   Wt 87.1 kg (192 lb)   SpO2 98%   BMI 29.10 kg/m²      Physical Exam  Vitals and nursing note reviewed.   Constitutional:       General: She is not in acute distress.     Appearance: She is well-developed. She is obese.   HENT:      Head: Normocephalic and atraumatic.   Eyes:      Conjunctiva/sclera: Conjunctivae normal.   Cardiovascular:      Rate and Rhythm: Normal rate and regular rhythm.      Heart sounds: No murmur heard.  Pulmonary:      Effort: Pulmonary effort is normal. No respiratory distress.      Breath sounds: Normal breath sounds.   Abdominal:      Palpations: Abdomen is soft.      Tenderness: There is no abdominal tenderness.   Musculoskeletal:         General: No swelling.      Cervical back: Neck supple.   Skin:     General: Skin is warm and dry.      Capillary Refill: Capillary refill takes less than 2 seconds.   Neurological:      Mental Status: She is alert.   Psychiatric:         Mood and Affect: Mood normal.         "

## 2025-02-14 ENCOUNTER — OFFICE VISIT (OUTPATIENT)
Dept: INTERNAL MEDICINE CLINIC | Age: 20
End: 2025-02-14

## 2025-02-14 VITALS
WEIGHT: 185 LBS | TEMPERATURE: 97.5 F | HEART RATE: 94 BPM | DIASTOLIC BLOOD PRESSURE: 78 MMHG | HEIGHT: 68 IN | OXYGEN SATURATION: 99 % | SYSTOLIC BLOOD PRESSURE: 120 MMHG | BODY MASS INDEX: 28.04 KG/M2

## 2025-02-14 DIAGNOSIS — Z00.00 ANNUAL PHYSICAL EXAM: Primary | ICD-10-CM

## 2025-02-14 PROCEDURE — 99905: CPT | Performed by: INTERNAL MEDICINE

## 2025-02-14 NOTE — PATIENT INSTRUCTIONS
"Patient Education     Routine physical for adults   The Basics   Written by the doctors and editors at [facility]   What is a physical? -- A physical is a routine visit, or \"check-up,\" with your doctor. You might also hear it called a \"wellness visit\" or \"preventive visit.\"  During each visit, the doctor will:   Ask about your physical and mental health   Ask about your habits, behaviors, and lifestyle   Do an exam   Give you vaccines if needed   Talk to you about any medicines you take   Give advice about your health   Answer your questions  Getting regular check-ups is an important part of taking care of your health. It can help your doctor find and treat any problems you have. But it's also important for preventing health problems.  A routine physical is different from a \"sick visit.\" A sick visit is when you see a doctor because of a health concern or problem. Since physicals are scheduled ahead of time, you can think about what you want to ask the doctor.  How often should I get a physical? -- It depends on your age and health. In general, for people age 21 years and older:   If you are younger than 50 years, you might be able to get a physical every 3 years.   If you are 50 years or older, your doctor might recommend a physical every year.  If you have an ongoing health condition, like diabetes or high blood pressure, your doctor will probably want to see you more often.  What happens during a physical? -- In general, each visit will include:   Physical exam - The doctor or nurse will check your height, weight, heart rate, and blood pressure. They will also look at your eyes and ears. They will ask about how you are feeling and whether you have any symptoms that bother you.   Medicines - It's a good idea to bring a list of all the medicines you take to each doctor visit. Your doctor will talk to you about your medicines and answer any questions. Tell them if you are having any side effects that bother you. You " "should also tell them if you are having trouble paying for any of your medicines.   Habits and behaviors - This includes:   Your diet   Your exercise habits   Whether you smoke, drink alcohol, or use drugs   Whether you are sexually active   Whether you feel safe at home  Your doctor will talk to you about things you can do to improve your health and lower your risk of health problems. They will also offer help and support. For example, if you want to quit smoking, they can give you advice and might prescribe medicines. If you want to improve your diet or get more physical activity, they can help you with this, too.   Lab tests, if needed - The tests you get will depend on your age and situation. For example, your doctor might want to check your:   Cholesterol   Blood sugar   Iron level   Vaccines - The recommended vaccines will depend on your age, health, and what vaccines you already had. Vaccines are very important because they can prevent certain serious or deadly infections.   Discussion of screening - \"Screening\" means checking for diseases or other health problems before they cause symptoms. Your doctor can recommend screening based on your age, risk, and preferences. This might include tests to check for:   Cancer, such as breast, prostate, cervical, ovarian, colorectal, prostate, lung, or skin cancer   Sexually transmitted infections, such as chlamydia and gonorrhea   Mental health conditions like depression and anxiety  Your doctor will talk to you about the different types of screening tests. They can help you decide which screenings to have. They can also explain what the results might mean.   Answering questions - The physical is a good time to ask the doctor or nurse questions about your health. If needed, they can refer you to other doctors or specialists, too.  Adults older than 65 years often need other care, too. As you get older, your doctor will talk to you about:   How to prevent falling at " home   Hearing or vision tests   Memory testing   How to take your medicines safely   Making sure that you have the help and support you need at home  All topics are updated as new evidence becomes available and our peer review process is complete.  This topic retrieved from [facility] on: May 02, 2024.  Topic 705390 Version 1.0  Release: 32.4.3 - C32.122  © 2024 UpToDate, Inc. and/or its affiliates. All rights reserved.  Consumer Information Use and Disclaimer   Disclaimer: This generalized information is a limited summary of diagnosis, treatment, and/or medication information. It is not meant to be comprehensive and should be used as a tool to help the user understand and/or assess potential diagnostic and treatment options. It does NOT include all information about conditions, treatments, medications, side effects, or risks that may apply to a specific patient. It is not intended to be medical advice or a substitute for the medical advice, diagnosis, or treatment of a health care provider based on the health care provider's examination and assessment of a patient's specific and unique circumstances. Patients must speak with a health care provider for complete information about their health, medical questions, and treatment options, including any risks or benefits regarding use of medications. This information does not endorse any treatments or medications as safe, effective, or approved for treating a specific patient. UpToDate, Inc. and its affiliates disclaim any warranty or liability relating to this information or the use thereof.The use of this information is governed by the Terms of Use, available at https://www.[employer]pl[employer] Technologiesuwer.com/en/know/clinical-effectiveness-terms. 2024© UpToDate, Inc. and its affiliates and/or licensors. All rights reserved.  Copyright   © 2024 UpToDate, Inc. and/or its affiliates. All rights reserved.

## 2025-02-14 NOTE — PROGRESS NOTES
Name: Alejandra Velasquez      : 2005      MRN: [id number]  Encounter Provider: Teri Gerber DO  Encounter Date: 2025   Encounter department: [facility]  :  Assessment & Plan  Annual physical exam  - History and physical examination done  - Pt was counseled to eat a heart healthy diet, to drink at least 2 L of water daily, to take a daily multivitamin and to exercise for at least 30 minutes of cardio exercise daily, for at least 5 days a week.  - CBC and TSH were done within the past 1 month so we will order a CMP and lipid panel and follow-up with results.  -She is up-to-date with her COVID shots x 2  -She is up-to-date with her Pap smear  -Will complete her physical form as requested  - follow up with PCP as scheduled.    Orders:  •  Lipid panel; Future  •  Comprehensive metabolic panel; Future           History of Present Illness     HPI  Patient presents for an annual physical exam.    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     Sexual history, STD history and HIV testing-never been sexually active, hiv testing - done and neg     Gynecological history/Prostate health/testicular health history- LMP - last month , last pap smear - Aug 2024    Colonoscopy-N/A    Immunization history-up to date with covid shots x 2    Dental visit-every 6 months     Vision-myopia - glasses and contacts( issues with all vision)      Family history-  Dm - dad  Thyroid ca - mom    Today, patient she  "would like to have her school physical form completed      Review of Systems   Constitutional:  Negative for activity change, chills, fatigue, fever and unexpected weight change.   HENT:  Negative for ear pain, postnasal drip, rhinorrhea, sinus pressure and sore throat.    Eyes:  Negative for pain.   Respiratory:  Negative for cough, choking, chest tightness, shortness of breath and wheezing.    Cardiovascular:  Negative for chest pain, palpitations and leg swelling.   Gastrointestinal:  Negative for abdominal pain, constipation, diarrhea, nausea and vomiting.   Genitourinary:  Negative for dysuria and hematuria.   Musculoskeletal:  Negative for arthralgias, back pain, gait problem, joint swelling, myalgias and neck stiffness.   Skin:  Negative for pallor and rash.   Neurological:  Positive for headaches (occasional). Negative for dizziness, tremors, seizures, syncope and light-headedness.   Hematological:  Negative for adenopathy.   Psychiatric/Behavioral:  Negative for behavioral problems.        Objective   /78 (BP Location: Left arm, Patient Position: Sitting, Cuff Size: Standard)   Pulse 94   Temp 97.5 °F (36.4 °C) (Temporal)   Ht 5' 8.11\" (1.73 m)   Wt 83.9 kg (185 lb)   SpO2 99%   BMI 28.04 kg/m²      Physical Exam  Constitutional:       General: She is not in acute distress.     Appearance: She is well-developed. She is not diaphoretic.   HENT:      Head: Normocephalic and atraumatic.      Right Ear: External ear normal.      Left Ear: External ear normal.      Nose: Nose normal.      Mouth/Throat:      Pharynx: No oropharyngeal exudate.   Eyes:      General: No scleral icterus.        Right eye: No discharge.         Left eye: No discharge.      Conjunctiva/sclera: Conjunctivae normal.      Pupils: Pupils are equal, round, and reactive to light.   Neck:      Thyroid: No thyromegaly.      Vascular: No JVD.      Trachea: No tracheal deviation.   Cardiovascular:      Rate and Rhythm: Normal rate " and regular rhythm.      Heart sounds: Normal heart sounds. No murmur heard.     No friction rub. No gallop.   Pulmonary:      Effort: Pulmonary effort is normal. No respiratory distress.      Breath sounds: Normal breath sounds. No wheezing or rales.   Chest:      Chest wall: No tenderness.   Abdominal:      General: Bowel sounds are normal. There is no distension.      Palpations: Abdomen is soft. There is no mass.      Tenderness: There is no abdominal tenderness. There is no guarding or rebound.   Musculoskeletal:         General: No tenderness or deformity. Normal range of motion.      Cervical back: Normal range of motion and neck supple.   Lymphadenopathy:      Cervical: No cervical adenopathy.   Skin:     General: Skin is warm and dry.      Coloration: Skin is not pale.      Findings: No erythema or rash.   Neurological:      Mental Status: She is alert and oriented to person, place, and time.      Cranial Nerves: No cranial nerve deficit.      Motor: No abnormal muscle tone.      Coordination: Coordination normal.      Deep Tendon Reflexes: Reflexes are normal and symmetric.      Comments: Cranial nerves 2-12 are intact bilaterally  Muscle strength is 5/5 in all extremities  Sensation is intact in bilateral face and extremities  Rapid alternating movement and finger-to-nose pointing test intact   Deep tendon reflexes are 2+ bilaterally  Gait is intact         Psychiatric:         Behavior: Behavior normal.

## 2025-04-17 ENCOUNTER — OFFICE VISIT (OUTPATIENT)
Dept: INTERNAL MEDICINE CLINIC | Facility: OTHER | Age: 20
End: 2025-04-17
Payer: COMMERCIAL

## 2025-04-17 VITALS
WEIGHT: 180 LBS | TEMPERATURE: 98.7 F | BODY MASS INDEX: 26.66 KG/M2 | HEIGHT: 69 IN | DIASTOLIC BLOOD PRESSURE: 76 MMHG | HEART RATE: 91 BPM | SYSTOLIC BLOOD PRESSURE: 112 MMHG | OXYGEN SATURATION: 98 %

## 2025-04-17 DIAGNOSIS — J06.9 VIRAL UPPER RESPIRATORY TRACT INFECTION: Primary | ICD-10-CM

## 2025-04-17 PROCEDURE — 99213 OFFICE O/P EST LOW 20 MIN: CPT | Performed by: NURSE PRACTITIONER

## 2025-04-17 RX ORDER — FLUTICASONE PROPIONATE 50 MCG
2 SPRAY, SUSPENSION (ML) NASAL DAILY
Qty: 16 G | Refills: 0 | Status: SHIPPED | OUTPATIENT
Start: 2025-04-17

## 2025-04-17 NOTE — ASSESSMENT & PLAN NOTE
Start flonase 2 sprays each nostril once daily  Continue OTC cold medication as needed  Warm steam  Rest and hydration  May use ibuprofen as needed for sore throat  Follow up if symptoms worsen or do not improve after 7-10 days   Orders:    fluticasone (FLONASE) 50 mcg/act nasal spray; 2 sprays into each nostril daily

## 2025-04-17 NOTE — PROGRESS NOTES
"Name: Alejandra Velasquez      : 2005      MRN: [id number]  Encounter Provider: ROSA Gray  Encounter Date: 2025   Encounter department: [facility]  :  Assessment & Plan  Viral upper respiratory tract infection  Start flonase 2 sprays each nostril once daily  Continue OTC cold medication as needed  Warm steam  Rest and hydration  May use ibuprofen as needed for sore throat  Follow up if symptoms worsen or do not improve after 7-10 days   Orders:    fluticasone (FLONASE) 50 mcg/act nasal spray; 2 sprays into each nostril daily           History of Present Illness   Sore Throat   Associated symptoms include congestion and coughing. Pertinent negatives include no diarrhea, ear discharge, ear pain, headaches or shortness of breath.   Sinusitis  Associated symptoms include congestion, coughing, sinus pressure, sneezing and a sore throat. Pertinent negatives include no chills, ear pain, headaches or shortness of breath.     Patient presents today with URI symptoms x 3 days. Symptoms include sore throat, headache pressure throughout, nasal congestion, and is losing her voice. She is using sudafed and zyrtec.   She denies any hx of allergies.   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       Review of Systems   Constitutional:  Negative for chills and fever.   HENT:  Positive for congestion, postnasal drip, rhinorrhea, sinus pressure, sneezing and sore throat. Negative for ear discharge, ear pain and sinus pain.    Respiratory:  Positive for cough. Negative for chest tightness, shortness of breath and wheezing.    Gastrointestinal:  Negative for diarrhea.   Musculoskeletal:  Negative for myalgias.   Neurological:  Negative for headaches.       Objective   /76 (BP Location: Left arm, Patient Position: Sitting, Cuff Size: Standard)   Pulse 91   Temp 98.7 °F (37.1 °C) (Temporal)   Ht 5' 9\" (1.753 m)   Wt 81.6 kg (180 lb)   SpO2 98%   BMI 26.58 kg/m²    "   Physical Exam  Vitals reviewed.   Constitutional:       General: She is not in acute distress.     Appearance: Normal appearance. She is normal weight. She is not diaphoretic.   HENT:      Head: Normocephalic and atraumatic.      Right Ear: Tympanic membrane and external ear normal.      Left Ear: Tympanic membrane and external ear normal.      Nose: Rhinorrhea present.      Mouth/Throat:      Mouth: Mucous membranes are moist.      Pharynx: Oropharynx is clear. Posterior oropharyngeal erythema (mild) present. No oropharyngeal exudate.   Eyes:      Extraocular Movements: Extraocular movements intact.      Conjunctiva/sclera: Conjunctivae normal.      Pupils: Pupils are equal, round, and reactive to light.   Cardiovascular:      Rate and Rhythm: Normal rate and regular rhythm.      Heart sounds: Normal heart sounds.   Pulmonary:      Effort: Pulmonary effort is normal. No respiratory distress.      Breath sounds: Normal breath sounds. No wheezing, rhonchi or rales.   Lymphadenopathy:      Cervical: No cervical adenopathy.   Neurological:      Mental Status: She is alert and oriented to person, place, and time. Mental status is at baseline.   Psychiatric:         Mood and Affect: Mood normal.         Behavior: Behavior normal.

## 2025-08-14 ENCOUNTER — ANNUAL EXAM (OUTPATIENT)
Dept: OBGYN CLINIC | Facility: CLINIC | Age: 20
End: 2025-08-14
Payer: COMMERCIAL